# Patient Record
Sex: FEMALE | Race: WHITE | NOT HISPANIC OR LATINO | Employment: OTHER | URBAN - METROPOLITAN AREA
[De-identification: names, ages, dates, MRNs, and addresses within clinical notes are randomized per-mention and may not be internally consistent; named-entity substitution may affect disease eponyms.]

---

## 2017-08-01 ENCOUNTER — APPOINTMENT (OUTPATIENT)
Dept: WOMENS IMAGING | Facility: HOSPITAL | Age: 72
End: 2017-08-01

## 2017-08-01 PROCEDURE — G0202 SCR MAMMO BI INCL CAD: HCPCS | Performed by: RADIOLOGY

## 2017-08-01 PROCEDURE — MDREVIEWSP: Performed by: RADIOLOGY

## 2017-08-01 PROCEDURE — 77063 BREAST TOMOSYNTHESIS BI: CPT | Performed by: RADIOLOGY

## 2018-08-03 ENCOUNTER — APPOINTMENT (OUTPATIENT)
Dept: WOMENS IMAGING | Facility: HOSPITAL | Age: 73
End: 2018-08-03

## 2018-08-03 PROCEDURE — 77067 SCR MAMMO BI INCL CAD: CPT | Performed by: RADIOLOGY

## 2018-08-03 PROCEDURE — 77063 BREAST TOMOSYNTHESIS BI: CPT | Performed by: RADIOLOGY

## 2018-08-03 PROCEDURE — MDREVIEWSP: Performed by: RADIOLOGY

## 2018-09-18 ENCOUNTER — OFFICE VISIT (OUTPATIENT)
Dept: INTERNAL MEDICINE | Facility: CLINIC | Age: 73
End: 2018-09-18

## 2018-09-18 VITALS
OXYGEN SATURATION: 99 % | RESPIRATION RATE: 18 BRPM | HEART RATE: 75 BPM | BODY MASS INDEX: 18.99 KG/M2 | DIASTOLIC BLOOD PRESSURE: 68 MMHG | WEIGHT: 114 LBS | HEIGHT: 65 IN | SYSTOLIC BLOOD PRESSURE: 136 MMHG

## 2018-09-18 DIAGNOSIS — Z23 NEED FOR INFLUENZA VACCINATION: ICD-10-CM

## 2018-09-18 DIAGNOSIS — Z00.00 HEALTH MAINTENANCE EXAMINATION: Primary | ICD-10-CM

## 2018-09-18 PROCEDURE — 90662 IIV NO PRSV INCREASED AG IM: CPT | Performed by: INTERNAL MEDICINE

## 2018-09-18 PROCEDURE — 99387 INIT PM E/M NEW PAT 65+ YRS: CPT | Performed by: INTERNAL MEDICINE

## 2018-09-18 PROCEDURE — G0008 ADMIN INFLUENZA VIRUS VAC: HCPCS | Performed by: INTERNAL MEDICINE

## 2018-09-18 RX ORDER — PHENOL 1.4 %
600 AEROSOL, SPRAY (ML) MUCOUS MEMBRANE DAILY
COMMUNITY

## 2018-09-18 RX ORDER — CHLORAL HYDRATE 500 MG
CAPSULE ORAL
COMMUNITY

## 2018-09-18 NOTE — PROGRESS NOTES
Chief Complaint  Lani oLpez is a 73 y.o. female who presents for Annual Exam (NEW PT CPE)  .    History of Present Illness   Lani is here as a new patient to establish care.  She saw her former PCP Dr. Cody in November 2017.  She had a tetanus shot in November.  She isn't sure if she had an AWV at that time or not.  We do not have any of her old records.  She has had pneumonia shots but doesn't know the dates.      Mammo: 8/3/2018  Pap: S/P hyst, hasn't had one in 20 years.   DEXA: Was once on fosamax and couldn't tolerate, has not had one for several years. Exercises and takes calcium.  did her last one  C-scope: Was normal, does not want to do another unless she needs to.  She thinks it was about 8 years ago.      Exercise: Golf, tennis, and walks three miles daily.     There is no immunization history for the selected administration types on file for this patient.    Review of Systems   Constitution: Negative for chills, fever and malaise/fatigue.   HENT: Negative for hearing loss, hoarse voice and sore throat.    Eyes: Negative for blurred vision, double vision and visual disturbance.   Cardiovascular: Negative for chest pain, leg swelling and palpitations.   Respiratory: Negative for cough and shortness of breath.    Endocrine: Negative for polydipsia and polyuria.   Hematologic/Lymphatic: Does not bruise/bleed easily.   Skin: Negative for rash and suspicious lesions.   Musculoskeletal: Negative for arthritis and myalgias.   Gastrointestinal: Negative for abdominal pain, change in bowel habit, constipation, diarrhea, dysphagia, hematochezia, melena, nausea and vomiting.   Genitourinary: Negative for dysuria and hematuria.   Neurological: Negative for dizziness, headaches and light-headedness.   Psychiatric/Behavioral: Negative for depression. The patient is not nervous/anxious.        There is no problem list on file for this patient.      No past medical history on file.    Past Surgical  "History:   Procedure Laterality Date   • BREAST BIOPSY     • HYSTERECTOMY         Family History   Problem Relation Age of Onset   • Leukemia Mother    • Hypothyroidism Mother    • Hypertension Mother    • Emphysema Father    • Breast cancer Sister    • Colon cancer Maternal Grandfather         age 86       Social History     Social History   • Marital status:      Spouse name: N/A   • Number of children: 3   • Years of education: N/A     Occupational History   • retired teacher      Social History Main Topics   • Smoking status: Former Smoker   • Smokeless tobacco: Never Used      Comment: former light smoker.  quit over 20 years ago   • Alcohol use Yes      Comment: rarely   • Drug use: No   • Sexual activity: Not on file     Other Topics Concern   • Not on file     Social History Narrative   • No narrative on file       No current outpatient prescriptions on file prior to visit.     No current facility-administered medications on file prior to visit.        No Known Allergies    Vitals:    09/18/18 1500   BP: 136/68   Pulse: 75   Resp: 18   SpO2: 99%   Weight: 51.7 kg (114 lb)   Height: 165.1 cm (65\")       Body mass index is 18.97 kg/m².    Objective   Physical Exam   Constitutional: She is oriented to person, place, and time. She appears well-developed and well-nourished. No distress.   HENT:   Head: Normocephalic and atraumatic.   Nose: Nose normal.   Mouth/Throat: Oropharynx is clear and moist.   Eyes: Pupils are equal, round, and reactive to light. Conjunctivae and EOM are normal. No scleral icterus.   Neck: Normal range of motion. Neck supple. No thyromegaly present.   Cardiovascular: Normal rate, regular rhythm and normal heart sounds.  Exam reveals no gallop and no friction rub.    No murmur heard.  Pulses:       Carotid pulses are 2+ on the right side, and 2+ on the left side.       Femoral pulses are 2+ on the right side, and 2+ on the left side.       Dorsalis pedis pulses are 2+ on the right " side, and 2+ on the left side.        Posterior tibial pulses are 2+ on the right side, and 2+ on the left side.   Pulmonary/Chest: Effort normal and breath sounds normal. No respiratory distress. She has no wheezes. She has no rales. Right breast exhibits no mass and no nipple discharge. Left breast exhibits no mass and no nipple discharge.   Abdominal: Soft. Bowel sounds are normal. She exhibits no distension and no mass. There is no tenderness.   Musculoskeletal: Normal range of motion. She exhibits no edema.     Vascular Status -  Her right foot exhibits no edema. Her left foot exhibits no edema.  Skin Integrity  -  Her right foot skin is intact.Her left foot skin is intact..  Lymphadenopathy:     She has no cervical adenopathy.     She has no axillary adenopathy.        Right: No inguinal and no supraclavicular adenopathy present.        Left: No inguinal and no supraclavicular adenopathy present.   Neurological: She is alert and oriented to person, place, and time. She has normal reflexes. No cranial nerve deficit.   Skin: Skin is warm and dry.   Psychiatric: She has a normal mood and affect. Her speech is normal and behavior is normal. Judgment and thought content normal. Cognition and memory are normal.   Vitals reviewed.        Results for orders placed or performed during the hospital encounter of 04/11/16   Adult transthoracic echo complete   Result Value Ref Range    BSA 1.6 m^2    IVSd 0.86 cm    LVIDd 4.2 cm    LVIDs 2.8 cm    LVPWd 0.92 cm    IVS/LVPW 0.93     FS 34.8 %    EDV(Teich) 80.4 ml    ESV(Teich) 28.7 ml    EF(Teich) 64.3 %    EDV(cubed) 76.3 ml    ESV(cubed) 21.2 ml    EF(cubed) 72.3 %    LV mass(C)d 118.9 grams    LV mass(C)dI 76.1 grams/m^2    SV(Teich) 51.7 ml    SI(Teich) 33.1 ml/m^2    SV(cubed) 55.1 ml    SI(cubed) 35.2 ml/m^2    Ao root diam 2.7 cm    Ao root area 5.6 cm^2    ACS 1.9 cm    LVOT diam 1.9 cm    LVOT area 3.0 cm^2    LVOT area(traced) 2.8 cm^2    RVOT diam 1.9 cm    RVOT  area 2.8 cm^2    LVLd ap4 6.3 cm    EDV(MOD-sp4) 61.0 ml    LVLs ap4 5.5 cm    ESV(MOD-sp4) 21.0 ml    EF(MOD-sp4) 65.6 %    SV(MOD-sp4) 40.0 ml    SI(MOD-sp4) 25.6 ml/m^2    MV A dur 0.11 sec    MV E max per 95.6 cm/sec    MV A max per 95.1 cm/sec    MV E/A 1.0     MV V2 max 93.8 cm/sec    MV max PG 3.5 mmHg    MV V2 mean 65.6 cm/sec    MV mean PG 2.0 mmHg    MV V2 VTI 24.0 cm    MVA(VTI) 2.3 cm^2    MV P1/2t max per 98.5 cm/sec    MV P1/2t 63.5 msec    MVA(P1/2t) 3.5 cm^2    MV dec slope 453.9 cm/sec^2    MV dec time 0.23 sec    Ao pk per 129.4 cm/sec    Ao max PG 6.7 mmHg    Ao max PG (full) 4.2 mmHg    Ao V2 mean 85.1 cm/sec    Ao mean PG 3.3 mmHg    Ao mean PG (full) 1.6 mmHg    Ao V2 VTI 25.8 cm    ARY(I,A) 2.2 cm^2    ARY(I,D) 2.2 cm^2    ARY(V,A) 1.8 cm^2    ARY(V,D) 1.8 cm^2    LV V1 max PG 2.5 mmHg    LV V1 mean PG 1.7 mmHg    LV V1 max 79.1 cm/sec    LV V1 mean 64.1 cm/sec    LV V1 VTI 19.0 cm    MR max per 520.7 cm/sec    MR max .5 mmHg    SV(Ao) 143.4 ml    SI(Ao) 91.7 ml/m^2    SV(LVOT) 56.1 ml    SV(RVOT) 36.4 ml    SI(LVOT) 35.9 ml/m^2    PA V2 max 78.7 cm/sec    PA max PG 2.5 mmHg    PA max PG (full) -0.11 mmHg    BH CV ECHO ZULLY - PVA(V,A) 2.9 cm^2    BH CV ECHO ZULLY - PVA(V,D) 2.9 cm^2    PA acc slope 11.5 cm/sec^2    PA acc time 0.13 sec    RV V1 max PG 2.6 mmHg    RV V1 mean PG 1.1 mmHg    RV V1 max 80.5 cm/sec    RV V1 mean 48.8 cm/sec    RV V1 VTI 12.9 cm    TR max per 230.8 cm/sec    RVSP(TR) 24.3 mmHg    RAP systole 3.0 mmHg    PA pr(Accel) 22.0 mmHg    Pulm Sys Per 51.9 cm/sec    Pulm Mejias Per 45.6 cm/sec    Pulm S/D 1.1      CV ECHO ZULLY - BZI_BMI 19.1 kilograms/m^2     CV ECHO ZULLY - BSA([place]) 1.5 m^2     CV ECHO ZULLY - BZI_METRIC_WEIGHT 52.2 kg     CV ECHO ZULLY - BZI_METRIC_HEIGHT 165.1 cm    TDI S' 14 cm    RV Base 2.9 cm    LA volume 31.0 cm3    E/E' ratio 12.0     LV Systolic Volume Index 13.0 mL/m2    LV Diastolic Volume Index 38.0 mL/m2    LA Volume Index 19.0 mL/m2     TAPSE 1.7 cm    Lat Peak E' Per 9.0 cm/sec    Med Peak E' Per 7.0 cm/sec    Pulm A Revs Per 47.0 cm/sec    Abdo Ao Diam 1.8 cm    Pulm A Revs Dur 99.0 sec       Assessment/Plan   Diagnoses and all orders for this visit:    Health maintenance examination    Need for influenza vaccination  -     Fluzone High Dose =>65Years    Other orders  -     Omega-3 Fatty Acids (FISH OIL) 1000 MG capsule capsule; Take  by mouth Daily With Breakfast.  -     calcium carbonate (OS-MARC) 600 MG tablet; Take 600 mg by mouth Daily.  -     Multiple Vitamins-Minerals (MULTIVITAMIN ADULT PO); Take  by mouth.        Discussion/Summary  Lani is here for routine CPE.  We really need to get her old records in order to figure out what she is due for.  I have asked her to physically go and pick her records up and bring them in.  I suspect she is up to date on most health maintenance.  We will base a lab appt on what has been done in the last year.  She is a healthy 72 y/o who exercises and takes good care of herself.        No Follow-up on file.

## 2019-08-20 ENCOUNTER — APPOINTMENT (OUTPATIENT)
Dept: WOMENS IMAGING | Facility: HOSPITAL | Age: 74
End: 2019-08-20

## 2019-08-20 PROCEDURE — 77063 BREAST TOMOSYNTHESIS BI: CPT | Performed by: RADIOLOGY

## 2019-08-20 PROCEDURE — 77067 SCR MAMMO BI INCL CAD: CPT | Performed by: RADIOLOGY

## 2019-08-20 PROCEDURE — MDREVIEWSP: Performed by: RADIOLOGY

## 2019-09-24 DIAGNOSIS — Z00.00 HEALTHCARE MAINTENANCE: Primary | ICD-10-CM

## 2019-09-28 LAB
ALBUMIN SERPL-MCNC: 4.4 G/DL (ref 3.5–5.2)
ALBUMIN/GLOB SERPL: 1.6 G/DL
ALP SERPL-CCNC: 77 U/L (ref 39–117)
ALT SERPL-CCNC: 17 U/L (ref 1–33)
APPEARANCE UR: CLEAR
AST SERPL-CCNC: 22 U/L (ref 1–32)
BACTERIA #/AREA URNS HPF: NORMAL /HPF
BASOPHILS # BLD AUTO: 0.04 10*3/MM3 (ref 0–0.2)
BASOPHILS NFR BLD AUTO: 0.6 % (ref 0–1.5)
BILIRUB SERPL-MCNC: 0.7 MG/DL (ref 0.2–1.2)
BILIRUB UR QL STRIP: NEGATIVE
BUN SERPL-MCNC: 12 MG/DL (ref 8–23)
BUN/CREAT SERPL: 15.2 (ref 7–25)
CALCIUM SERPL-MCNC: 9.2 MG/DL (ref 8.6–10.5)
CHLORIDE SERPL-SCNC: 101 MMOL/L (ref 98–107)
CHOLEST SERPL-MCNC: 203 MG/DL (ref 0–200)
CO2 SERPL-SCNC: 26.8 MMOL/L (ref 22–29)
COLOR UR: YELLOW
CREAT SERPL-MCNC: 0.79 MG/DL (ref 0.57–1)
EOSINOPHIL # BLD AUTO: 0.22 10*3/MM3 (ref 0–0.4)
EOSINOPHIL NFR BLD AUTO: 3.5 % (ref 0.3–6.2)
EPI CELLS #/AREA URNS HPF: NORMAL /HPF
ERYTHROCYTE [DISTWIDTH] IN BLOOD BY AUTOMATED COUNT: 12.1 % (ref 12.3–15.4)
GLOBULIN SER CALC-MCNC: 2.8 GM/DL
GLUCOSE SERPL-MCNC: 95 MG/DL (ref 65–99)
GLUCOSE UR QL: NEGATIVE
HCT VFR BLD AUTO: 41.8 % (ref 34–46.6)
HDLC SERPL-MCNC: 73 MG/DL (ref 40–60)
HGB BLD-MCNC: 13.6 G/DL (ref 12–15.9)
HGB UR QL STRIP: NEGATIVE
IMM GRANULOCYTES # BLD AUTO: 0.02 10*3/MM3 (ref 0–0.05)
IMM GRANULOCYTES NFR BLD AUTO: 0.3 % (ref 0–0.5)
KETONES UR QL STRIP: NEGATIVE
LDLC SERPL CALC-MCNC: 117 MG/DL (ref 0–100)
LEUKOCYTE ESTERASE UR QL STRIP: NEGATIVE
LYMPHOCYTES # BLD AUTO: 1.73 10*3/MM3 (ref 0.7–3.1)
LYMPHOCYTES NFR BLD AUTO: 27.5 % (ref 19.6–45.3)
MCH RBC QN AUTO: 30.3 PG (ref 26.6–33)
MCHC RBC AUTO-ENTMCNC: 32.5 G/DL (ref 31.5–35.7)
MCV RBC AUTO: 93.1 FL (ref 79–97)
MICRO URNS: NORMAL
MICRO URNS: NORMAL
MONOCYTES # BLD AUTO: 0.62 10*3/MM3 (ref 0.1–0.9)
MONOCYTES NFR BLD AUTO: 9.8 % (ref 5–12)
MUCOUS THREADS URNS QL MICRO: PRESENT /HPF
NEUTROPHILS # BLD AUTO: 3.67 10*3/MM3 (ref 1.7–7)
NEUTROPHILS NFR BLD AUTO: 58.3 % (ref 42.7–76)
NITRITE UR QL STRIP: NEGATIVE
NRBC BLD AUTO-RTO: 0 /100 WBC (ref 0–0.2)
PH UR STRIP: 7 [PH] (ref 5–7.5)
PLATELET # BLD AUTO: 271 10*3/MM3 (ref 140–450)
POTASSIUM SERPL-SCNC: 4.1 MMOL/L (ref 3.5–5.2)
PROT SERPL-MCNC: 7.2 G/DL (ref 6–8.5)
PROT UR QL STRIP: NEGATIVE
RBC # BLD AUTO: 4.49 10*6/MM3 (ref 3.77–5.28)
RBC #/AREA URNS HPF: NORMAL /HPF
SODIUM SERPL-SCNC: 142 MMOL/L (ref 136–145)
SP GR UR: 1.01 (ref 1–1.03)
TRIGL SERPL-MCNC: 67 MG/DL (ref 0–150)
URINALYSIS REFLEX: NORMAL
UROBILINOGEN UR STRIP-MCNC: 0.2 MG/DL (ref 0.2–1)
VLDLC SERPL CALC-MCNC: 13.4 MG/DL
WBC # BLD AUTO: 6.3 10*3/MM3 (ref 3.4–10.8)
WBC #/AREA URNS HPF: NORMAL /HPF

## 2019-10-03 ENCOUNTER — OFFICE VISIT (OUTPATIENT)
Dept: INTERNAL MEDICINE | Facility: CLINIC | Age: 74
End: 2019-10-03

## 2019-10-03 VITALS
RESPIRATION RATE: 12 BRPM | DIASTOLIC BLOOD PRESSURE: 80 MMHG | BODY MASS INDEX: 18.83 KG/M2 | OXYGEN SATURATION: 99 % | SYSTOLIC BLOOD PRESSURE: 122 MMHG | TEMPERATURE: 97.8 F | HEIGHT: 65 IN | WEIGHT: 113 LBS | HEART RATE: 81 BPM

## 2019-10-03 DIAGNOSIS — Z00.00 HEALTHCARE MAINTENANCE: Primary | ICD-10-CM

## 2019-10-03 DIAGNOSIS — I83.92 ASYMPTOMATIC VARICOSE VEINS OF LEFT LOWER EXTREMITY: ICD-10-CM

## 2019-10-03 DIAGNOSIS — M85.89 OSTEOPENIA OF MULTIPLE SITES: ICD-10-CM

## 2019-10-03 PROCEDURE — 99397 PER PM REEVAL EST PAT 65+ YR: CPT | Performed by: INTERNAL MEDICINE

## 2019-10-03 PROCEDURE — G0439 PPPS, SUBSEQ VISIT: HCPCS | Performed by: INTERNAL MEDICINE

## 2019-10-03 NOTE — PROGRESS NOTES
"Subjective       Chief Complaint   Patient presents with   • Annual Exam     review of medical issues        HPI:  Lani oLpez is a 74 y.o. female RTC in yearly CPE/ AWV, review of medical issues: Transfer from DR. Flores.   \"I dont have any complaints\".  Notes has a varicose vein on L leg. \"It does not hurt\".  Started wearing some support hose and seems to make worse and larger.  No bleeding.  Getting bigger over time. First noticed this vein for 20 years and \"I am not kidding\".    Had mammo this year, saw derm, dentist and optho all 9/2019.   Osteopenia - noted several years ago. Long term very active and thinks is more so now.  Took Fosamax for 2-3 years and no specific side effects.  \"I just really feel like my body did not want me to take that med\".  Told if she was not going to consider other meds then she should not get DEXA.  Last DEXA was 2000.  Stopped Fosamax ~2000.  Used to see Dr. Cody several years ago. Is not sure about records or vaccine issues.  Is surprised about the fact that records are not over here.   Pap D/C'd after KALANI-BSO in 1997 (for benign cyst).  C-scope was 2010 and it was negative.  \"I hope I never have to have another one of those\".  Notes that \"[facility] will let you do an FOBT\".  Told she could get a Cologuard or flex sig as well.     The following portions of the patient's history were reviewed and updated as appropriate: allergies, current medications, past family history, past medical history, past social history, past surgical history and problem list.    Review of Systems   Constitution: Negative for chills, fever, malaise/fatigue, weight gain and weight loss.   HENT: Negative for congestion, hearing loss, hoarse voice, odynophagia and sore throat.    Eyes: Negative for discharge, double vision, pain and redness.        Last optho exam 9/2019   Cardiovascular: Negative for chest pain, dyspnea on exertion, irregular heartbeat, near-syncope, palpitations and " syncope.   Respiratory: Negative for cough and shortness of breath.    Endocrine: Negative for polydipsia, polyphagia and polyuria.   Hematologic/Lymphatic: Negative for bleeding problem. Does not bruise/bleed easily.   Skin: Negative for rash and suspicious lesions.   Musculoskeletal: Negative for joint pain, joint swelling, muscle cramps, muscle weakness and myalgias.   Gastrointestinal: Negative for constipation, diarrhea, dysphagia, heartburn, nausea and vomiting.   Genitourinary: Negative for dysuria, frequency, hematuria and hesitancy.   Neurological: Negative for dizziness, headaches and light-headedness.   Psychiatric/Behavioral: Negative for depression. The patient does not have insomnia and is not nervous/anxious.    Allergic/Immunologic: Negative for environmental allergies and persistent infections.       Patient Care Team:  Virgilio Rivas MD as PCP - General (Internal Medicine)    Recent Hospitalizations: No recent hospitalization(s).    Depression Screen:   PHQ-2/PHQ-9 Depression Screening 10/3/2019   Little interest or pleasure in doing things 0   Feeling down, depressed, or hopeless 0   Trouble falling or staying asleep, or sleeping too much 0   Feeling tired or having little energy 0   Poor appetite or overeating 0   Feeling bad about yourself - or that you are a failure or have let yourself or your family down 0   Trouble concentrating on things, such as reading the newspaper or watching television 0   Moving or speaking so slowly that other people could have noticed. Or the opposite - being so fidgety or restless that you have been moving around a lot more than usual 0   Thoughts that you would be better off dead, or of hurting yourself in some way 0   Total Score 0   If you checked off any problems, how difficult have these problems made it for you to do your work, take care of things at home, or get along with other people? Not difficult at all       Functional and Cognitive  "Screening:  Functional & Cognitive Status 10/3/2019   Do you have difficulty preparing food and eating? No   Do you have difficulty bathing yourself, getting dressed or grooming yourself? No   Do you have difficulty using the toilet? No   Do you have difficulty moving around from place to place? No   Do you have trouble with steps or getting out of a bed or a chair? No   Current Diet Well Balanced Diet   Dental Exam Up to date   Eye Exam Up to date   Exercise (times per week) 5 times per week   Current Exercise Activities Include Walking   Do you need help using the phone?  No   Are you deaf or do you have serious difficulty hearing?  No   Do you need help with transportation? No   Do you need help shopping? No   Do you need help preparing meals?  No   Do you need help with housework?  No   Do you need help with laundry? No   Do you need help taking your medications? No   Do you need help managing money? No   Do you ever drive or ride in a car without wearing a seat belt? No   Have you felt unusual stress, anger or loneliness in the last month? No   Who do you live with? Spouse   If you need help, do you have trouble finding someone available to you? No   Have you been bothered in the last four weeks by sexual problems? No   Do you have difficulty concentrating, remembering or making decisions? No       Does the patient have evidence of cognitive impairment? no    Does not need ASA (and currently is not on it)    Vitals:    10/03/19 1114   BP: 122/80   Pulse: 81   Resp: 12   Temp: 97.8 °F (36.6 °C)   SpO2: 99%   Weight: 51.3 kg (113 lb)   Height: 165.1 cm (65\")   PainSc: 0-No pain       Body mass index is 18.8 kg/m².    Visual Acuity:  No exam data present    Advanced Care Planning:  Patient has an advance directive - a copy has not been provided. Have asked the patient to send this to us to add to record    Objective   Physical Exam   Constitutional: She is oriented to person, place, and time. She appears " well-developed and well-nourished. No distress.   HENT:   Head: Normocephalic and atraumatic.   Right Ear: Hearing, tympanic membrane, external ear and ear canal normal.   Left Ear: Hearing, tympanic membrane, external ear and ear canal normal.   Nose: Nose normal.   Mouth/Throat: Uvula is midline, oropharynx is clear and moist and mucous membranes are normal. No oropharyngeal exudate.   Soft cerumen bilateral, not fully obstructed.    Eyes: Conjunctivae, EOM and lids are normal. Pupils are equal, round, and reactive to light. Right eye exhibits no discharge. Left eye exhibits no discharge. No scleral icterus.   Neck: Trachea normal, normal range of motion and full passive range of motion without pain. Neck supple. Carotid bruit is not present. No thyroid mass and no thyromegaly present.   Cardiovascular: Normal rate, regular rhythm, S1 normal, S2 normal, normal heart sounds and intact distal pulses. Exam reveals no gallop and no friction rub.   No murmur heard.  Pulses:       Radial pulses are 2+ on the right side, and 2+ on the left side.        Dorsalis pedis pulses are 2+ on the right side, and 2+ on the left side.        Posterior tibial pulses are 2+ on the right side, and 2+ on the left side.   Diffuse spider veins on legs; Single large non-painful varicosity on L upper inner leg.   Pulmonary/Chest: Effort normal and breath sounds normal. No respiratory distress. She has no wheezes. She has no rhonchi. She has no rales.   Abdominal: Soft. Normal appearance and bowel sounds are normal. She exhibits no distension and no mass. There is no hepatosplenomegaly. There is no tenderness. There is no rebound and no guarding.   Musculoskeletal: Normal range of motion. She exhibits no edema.     Vascular Status -  Her right foot exhibits normal foot vasculature  and no edema. Her left foot exhibits normal foot vasculature  and no edema.  Lymphadenopathy:     She has no cervical adenopathy.     She has no axillary  adenopathy.        Right: No inguinal adenopathy present.        Left: No inguinal adenopathy present.   Neurological: She is alert and oriented to person, place, and time. She has normal strength and normal reflexes. She displays no tremor. No cranial nerve deficit or sensory deficit. She exhibits normal muscle tone. Gait normal.   Skin: Skin is warm and dry. No rash noted.   Psychiatric: She has a normal mood and affect. Her behavior is normal. Thought content normal. Cognition and memory are normal.   Vitals reviewed.      Assessment/Plan   Problems Addressed this Visit     Osteopenia of multiple sites    Asymptomatic varicose veins of left lower extremity      Other Visit Diagnoses     Healthcare maintenance    -  Primary              Diagnoses and all orders for this visit:    Healthcare maintenance    Osteopenia of multiple sites    Asymptomatic varicose veins of left lower extremity        Lani Lopez is a 74 y.o. female RTC in yearly CPE/ AWV, review of medical issues: Transfer from Dr. Flores.     1. Varicose Vein, L leg - asx'ic, no bleeding.  Chooses to monitor.   2. Osteopenia - noted in past, s/p Fosamax x 3 years ending in 2000.  Is hesitant for more meds.  Agrees to DEXA upcoming and will make informed decision from there.   3. Hx of psoriasis - noted in distant past, no recurrence. Sees derm annually, last visit 9/2019.  4. HM - labs d/w pt; Flu/ Tdap/ Hep A/ Prevnar/ Pvax/ Shingrix - discussed, pt to get old records today; C-scope due 2020, will refer next year (counseled pt and she agrees preferred path is C-scope); Mammo/ breast exam UTD 8/2019; Pap D/C s/p KALANI-BSO for benign cyst; DEXA schedule; Hep C Ab next labs; c/w TLC diet and exercise as she is; Preventative care plan provided to pt at end of visit; mineral oil to ears prior to shower to allow flush of cerumen B, no Q-tips.       Return in about 1 year (around 10/3/2020) for Annual physical, Medicare Wellness. (include Hep C Ab in  next labs)          Current Outpatient Medications:   •  calcium carbonate (OS-MARC) 600 MG tablet, Take 600 mg by mouth Daily., Disp: , Rfl:   •  Multiple Vitamins-Minerals (MULTIVITAMIN ADULT PO), Take  by mouth., Disp: , Rfl:   •  Omega-3 Fatty Acids (FISH OIL) 1000 MG capsule capsule, Take  by mouth Daily With Breakfast., Disp: , Rfl:

## 2019-10-03 NOTE — PATIENT INSTRUCTIONS
Medicare Wellness  Personal Prevention Plan of Service     Date of Office Visit:  10/03/2019  Encounter Provider:  Virgilio Rivas MD  Place of Service:  [facility]  Patient Name: Lani Lopez  :  1945    As part of the Medicare Wellness portion of your visit today, we are providing you with this personalized preventive plan of services (PPPS). This plan is based upon recommendations of the [place] Preventive Services Task Force (USPSTF) and the Advisory Committee on Immunization Practices (ACIP).    This lists the preventive care services that should be considered, and provides dates of when you are due. Items listed as completed are up-to-date and do not require any further intervention.    Health Maintenance   Topic Date Due   • TDAP/TD VACCINES (1 - Tdap) 1966   • ZOSTER VACCINE (1 of 2) 1997   • PNEUMOCOCCAL VACCINES (65+ LOW/MEDIUM RISK) (1 of 2 - PCV13) 2012   • HEPATITIS C SCREENING  2018   • INFLUENZA VACCINE  2019   • COLONOSCOPY  2020   • MAMMOGRAM  2020   • MEDICARE ANNUAL WELLNESS  10/03/2020       No orders of the defined types were placed in this encounter.      No Follow-up on file.

## 2020-09-09 DIAGNOSIS — Z00.00 HEALTHCARE MAINTENANCE: Primary | ICD-10-CM

## 2020-09-09 DIAGNOSIS — Z11.59 NEED FOR HEPATITIS C SCREENING TEST: ICD-10-CM

## 2020-09-09 DIAGNOSIS — E78.89 LIPIDS ABNORMAL: ICD-10-CM

## 2020-09-17 ENCOUNTER — APPOINTMENT (OUTPATIENT)
Dept: WOMENS IMAGING | Facility: HOSPITAL | Age: 75
End: 2020-09-17

## 2020-09-17 PROCEDURE — 77067 SCR MAMMO BI INCL CAD: CPT | Performed by: RADIOLOGY

## 2020-09-17 PROCEDURE — 77063 BREAST TOMOSYNTHESIS BI: CPT | Performed by: RADIOLOGY

## 2020-09-18 LAB
ALBUMIN SERPL-MCNC: 4.6 G/DL (ref 3.5–5.2)
ALBUMIN/GLOB SERPL: 1.8 G/DL
ALP SERPL-CCNC: 83 U/L (ref 39–117)
ALT SERPL-CCNC: 16 U/L (ref 1–33)
APPEARANCE UR: CLEAR
AST SERPL-CCNC: 22 U/L (ref 1–32)
BACTERIA #/AREA URNS HPF: NORMAL /HPF
BASOPHILS # BLD AUTO: 0.05 10*3/MM3 (ref 0–0.2)
BASOPHILS NFR BLD AUTO: 0.8 % (ref 0–1.5)
BILIRUB SERPL-MCNC: 0.4 MG/DL (ref 0–1.2)
BILIRUB UR QL STRIP: NEGATIVE
BUN SERPL-MCNC: 11 MG/DL (ref 8–23)
BUN/CREAT SERPL: 13.9 (ref 7–25)
CALCIUM SERPL-MCNC: 9.6 MG/DL (ref 8.6–10.5)
CHLORIDE SERPL-SCNC: 102 MMOL/L (ref 98–107)
CHOLEST SERPL-MCNC: 212 MG/DL (ref 0–200)
CO2 SERPL-SCNC: 26.5 MMOL/L (ref 22–29)
COLOR UR: YELLOW
CREAT SERPL-MCNC: 0.79 MG/DL (ref 0.57–1)
EOSINOPHIL # BLD AUTO: 0.19 10*3/MM3 (ref 0–0.4)
EOSINOPHIL NFR BLD AUTO: 3.2 % (ref 0.3–6.2)
EPI CELLS #/AREA URNS HPF: NORMAL /HPF (ref 0–10)
ERYTHROCYTE [DISTWIDTH] IN BLOOD BY AUTOMATED COUNT: 11.9 % (ref 12.3–15.4)
GLOBULIN SER CALC-MCNC: 2.5 GM/DL
GLUCOSE SERPL-MCNC: 96 MG/DL (ref 65–99)
GLUCOSE UR QL: NEGATIVE
HCT VFR BLD AUTO: 40.3 % (ref 34–46.6)
HCV AB S/CO SERPL IA: <0.1 S/CO RATIO (ref 0–0.9)
HDLC SERPL-MCNC: 72 MG/DL (ref 40–60)
HGB BLD-MCNC: 13.5 G/DL (ref 12–15.9)
HGB UR QL STRIP: NEGATIVE
IMM GRANULOCYTES # BLD AUTO: 0.01 10*3/MM3 (ref 0–0.05)
IMM GRANULOCYTES NFR BLD AUTO: 0.2 % (ref 0–0.5)
KETONES UR QL STRIP: NEGATIVE
LDLC SERPL CALC-MCNC: 128 MG/DL (ref 0–100)
LEUKOCYTE ESTERASE UR QL STRIP: NEGATIVE
LYMPHOCYTES # BLD AUTO: 1.36 10*3/MM3 (ref 0.7–3.1)
LYMPHOCYTES NFR BLD AUTO: 23.1 % (ref 19.6–45.3)
MCH RBC QN AUTO: 31.3 PG (ref 26.6–33)
MCHC RBC AUTO-ENTMCNC: 33.5 G/DL (ref 31.5–35.7)
MCV RBC AUTO: 93.3 FL (ref 79–97)
MICRO URNS: NORMAL
MICRO URNS: NORMAL
MONOCYTES # BLD AUTO: 0.52 10*3/MM3 (ref 0.1–0.9)
MONOCYTES NFR BLD AUTO: 8.8 % (ref 5–12)
MUCOUS THREADS URNS QL MICRO: PRESENT /HPF
NEUTROPHILS # BLD AUTO: 3.77 10*3/MM3 (ref 1.7–7)
NEUTROPHILS NFR BLD AUTO: 63.9 % (ref 42.7–76)
NITRITE UR QL STRIP: NEGATIVE
NRBC BLD AUTO-RTO: 0 /100 WBC (ref 0–0.2)
PH UR STRIP: 6 [PH] (ref 5–7.5)
PLATELET # BLD AUTO: 258 10*3/MM3 (ref 140–450)
POTASSIUM SERPL-SCNC: 4.2 MMOL/L (ref 3.5–5.2)
PROT SERPL-MCNC: 7.1 G/DL (ref 6–8.5)
PROT UR QL STRIP: NEGATIVE
RBC # BLD AUTO: 4.32 10*6/MM3 (ref 3.77–5.28)
RBC #/AREA URNS HPF: NORMAL /HPF (ref 0–2)
SODIUM SERPL-SCNC: 140 MMOL/L (ref 136–145)
SP GR UR: 1.02 (ref 1–1.03)
TRIGL SERPL-MCNC: 61 MG/DL (ref 0–150)
URINALYSIS REFLEX: NORMAL
UROBILINOGEN UR STRIP-MCNC: 0.2 MG/DL (ref 0.2–1)
VLDLC SERPL CALC-MCNC: 12.2 MG/DL
WBC # BLD AUTO: 5.9 10*3/MM3 (ref 3.4–10.8)
WBC #/AREA URNS HPF: NORMAL /HPF (ref 0–5)

## 2020-09-22 ENCOUNTER — OFFICE VISIT (OUTPATIENT)
Dept: INTERNAL MEDICINE | Facility: CLINIC | Age: 75
End: 2020-09-22

## 2020-09-22 VITALS
BODY MASS INDEX: 18.49 KG/M2 | HEART RATE: 54 BPM | OXYGEN SATURATION: 96 % | SYSTOLIC BLOOD PRESSURE: 110 MMHG | RESPIRATION RATE: 12 BRPM | TEMPERATURE: 97.1 F | HEIGHT: 65 IN | DIASTOLIC BLOOD PRESSURE: 80 MMHG | WEIGHT: 111 LBS

## 2020-09-22 DIAGNOSIS — M81.0 AGE-RELATED OSTEOPOROSIS WITHOUT CURRENT PATHOLOGICAL FRACTURE: ICD-10-CM

## 2020-09-22 DIAGNOSIS — M85.859 OSTEOPENIA OF HIP, UNSPECIFIED LATERALITY: ICD-10-CM

## 2020-09-22 DIAGNOSIS — Z00.00 HEALTHCARE MAINTENANCE: Primary | ICD-10-CM

## 2020-09-22 DIAGNOSIS — I83.92 ASYMPTOMATIC VARICOSE VEINS OF LEFT LOWER EXTREMITY: ICD-10-CM

## 2020-09-22 PROCEDURE — G0439 PPPS, SUBSEQ VISIT: HCPCS | Performed by: INTERNAL MEDICINE

## 2020-09-22 PROCEDURE — 99397 PER PM REEVAL EST PAT 65+ YR: CPT | Performed by: INTERNAL MEDICINE

## 2020-09-22 NOTE — PROGRESS NOTES
"Subjective       Chief Complaint   Patient presents with   • Annual Exam     review of medical issues        HPI:  Lani Lopez is a 75 y.o. female RTC in yearly AWV/ CPE, review of medical issues:  Has been doing well.  Health has been good. \"I dont have any questions\".   Is missing seeing family. No new dx over year.   1. Varicose Vein, L leg - asx'ic, no bleeding.  No pain.   2. Osteopenia hip and osteoporosis in spine, low FRAX - is very active. Does weights at home. Does all yard work. Very active overall. Will walk up to 5 miles daily, always gets 10 K steps daily.   3. Hx of psoriasis - noted in distant past, no recurrence.   4. HM - had mammogram recently, no exam done.  Desires to do cologuard and will consider C-scope 'once COVID goes down some'.     The following portions of the patient's history were reviewed and updated as appropriate: allergies, current medications, past family history, past medical history, past social history, past surgical history and problem list.    Review of Systems   Constitution: Negative for chills, fever, malaise/fatigue, weight gain and weight loss.   HENT: Negative for congestion, hearing loss (mild, not limiting), odynophagia and sore throat.    Eyes: Negative for discharge, double vision, pain and redness.        Last eye exam ~11/2019; wears readers     Cardiovascular: Negative for chest pain, dyspnea on exertion, irregular heartbeat, leg swelling, near-syncope, palpitations and syncope.   Respiratory: Positive for snoring. Negative for cough, shortness of breath and sleep disturbances due to breathing.    Hematologic/Lymphatic: Negative for bleeding problem. Does not bruise/bleed easily.   Skin: Negative for rash and suspicious lesions.   Musculoskeletal: Negative for joint pain, joint swelling, muscle cramps, muscle weakness and myalgias.   Gastrointestinal: Negative for constipation, diarrhea, dysphagia, heartburn, nausea and vomiting.   Genitourinary: Negative " for bladder incontinence, dysuria, frequency, hematuria, hesitancy and urgency.   Neurological: Negative for excessive daytime sleepiness, dizziness, headaches and light-headedness.   Psychiatric/Behavioral: Negative for depression. The patient does not have insomnia (sleep is good, 'mostly'.  Feels like will wake at times with stresses of all that is going on. ) and is not nervous/anxious.    Allergic/Immunologic: Negative for environmental allergies and persistent infections.       Patient Care Team:  Virgilio Rivas MD as PCP - General (Internal Medicine)    Recent Hospitalizations: No recent hospitalization(s).    Depression Screen:   PHQ-2/PHQ-9 Depression Screening 9/22/2020   Little interest or pleasure in doing things 0   Feeling down, depressed, or hopeless 0   Trouble falling or staying asleep, or sleeping too much -   Feeling tired or having little energy -   Poor appetite or overeating -   Feeling bad about yourself - or that you are a failure or have let yourself or your family down -   Trouble concentrating on things, such as reading the newspaper or watching television -   Moving or speaking so slowly that other people could have noticed. Or the opposite - being so fidgety or restless that you have been moving around a lot more than usual -   Thoughts that you would be better off dead, or of hurting yourself in some way -   Total Score 0   If you checked off any problems, how difficult have these problems made it for you to do your work, take care of things at home, or get along with other people? -       Functional and Cognitive Screening:  Functional & Cognitive Status 9/22/2020   Do you have difficulty preparing food and eating? No   Do you have difficulty bathing yourself, getting dressed or grooming yourself? No   Do you have difficulty using the toilet? No   Do you have difficulty moving around from place to place? No   Do you have trouble with steps or getting out of a bed or a chair? No  "  Current Diet Well Balanced Diet   Dental Exam Up to date   Eye Exam Up to date   Exercise (times per week) 7 times per week   Current Exercise Activities Include Walking   Do you need help using the phone?  No   Are you deaf or do you have serious difficulty hearing?  No   Do you need help with transportation? No   Do you need help shopping? No   Do you need help preparing meals?  No   Do you need help with housework?  No   Do you need help with laundry? No   Do you need help taking your medications? No   Do you need help managing money? No   Do you ever drive or ride in a car without wearing a seat belt? No   Have you felt unusual stress, anger or loneliness in the last month? -   Who do you live with? -   If you need help, do you have trouble finding someone available to you? -   Have you been bothered in the last four weeks by sexual problems? -   Do you have difficulty concentrating, remembering or making decisions? -       Does the patient have evidence of cognitive impairment? no    Does not need ASA (and currently is not on it)    Vitals:    09/22/20 1126   BP: 110/80   Pulse: 54   Resp: 12   Temp: 97.1 °F (36.2 °C)   SpO2: 96%   Weight: 50.3 kg (111 lb)   Height: 165.1 cm (65\")   PainSc: 0-No pain       Body mass index is 18.47 kg/m².    Visual Acuity:  No exam data present    Advanced Care Planning:  ACP discussion was held with the patient during this visit. Patient has an advance directive (not in EMR), copy requested.    Objective   Physical Exam  Vitals signs reviewed.   Constitutional:       General: She is not in acute distress.     Appearance: Normal appearance. She is well-developed. She is not ill-appearing or toxic-appearing.   HENT:      Head: Normocephalic and atraumatic.      Right Ear: Hearing, tympanic membrane, ear canal and external ear normal.      Left Ear: Hearing, tympanic membrane, ear canal and external ear normal.      Ears:      Comments: Cerumen removed to clear for exam     " Nose: Nose normal.      Mouth/Throat:      Mouth: Mucous membranes are moist.      Pharynx: Oropharynx is clear. Uvula midline. No oropharyngeal exudate.   Eyes:      General: Lids are normal. No scleral icterus.     Extraocular Movements: Extraocular movements intact.      Conjunctiva/sclera: Conjunctivae normal.      Pupils: Pupils are equal, round, and reactive to light.   Neck:      Musculoskeletal: Full passive range of motion without pain, normal range of motion and neck supple.      Thyroid: No thyroid mass or thyromegaly.      Vascular: No carotid bruit.      Trachea: Trachea normal.   Cardiovascular:      Rate and Rhythm: Normal rate and regular rhythm.      Pulses:           Radial pulses are 2+ on the right side and 2+ on the left side.        Dorsalis pedis pulses are 2+ on the right side and 2+ on the left side.        Posterior tibial pulses are 2+ on the right side and 2+ on the left side.      Heart sounds: Normal heart sounds, S1 normal and S2 normal. No murmur. No friction rub. No gallop.    Pulmonary:      Effort: Pulmonary effort is normal. No respiratory distress.      Breath sounds: Normal breath sounds. No wheezing, rhonchi or rales.   Chest:      Chest wall: No mass.      Breasts:         Right: No inverted nipple, mass, nipple discharge or skin change.         Left: No inverted nipple, mass, nipple discharge or skin change.   Abdominal:      General: Bowel sounds are normal. There is no distension.      Palpations: Abdomen is soft. There is no mass.      Tenderness: There is no abdominal tenderness. There is no guarding or rebound.   Musculoskeletal: Normal range of motion.      Right lower leg: No edema.      Left lower leg: No edema.      Right foot: Normal range of motion. No deformity or bunion.      Left foot: Normal range of motion. No deformity or bunion.   Feet:      Right foot:      Skin integrity: No ulcer, blister or skin breakdown.      Left foot:      Skin integrity: No ulcer,  blister or skin breakdown.   Lymphadenopathy:      Cervical: No cervical adenopathy.      Right cervical: No superficial, deep or posterior cervical adenopathy.     Left cervical: No superficial, deep or posterior cervical adenopathy.      Upper Body:      Right upper body: No supraclavicular, axillary or pectoral adenopathy.      Left upper body: No supraclavicular, axillary or pectoral adenopathy.      Lower Body: No right inguinal adenopathy. No left inguinal adenopathy.   Skin:     General: Skin is warm and dry.      Findings: No rash.   Neurological:      Mental Status: She is alert and oriented to person, place, and time.      Cranial Nerves: No cranial nerve deficit.      Sensory: No sensory deficit.      Motor: No weakness, tremor, atrophy or abnormal muscle tone.      Gait: Gait normal.      Deep Tendon Reflexes: Reflexes are normal and symmetric.      Reflex Scores:       Patellar reflexes are 2+ on the right side and 2+ on the left side.       Achilles reflexes are 2+ on the right side and 2+ on the left side.  Psychiatric:         Behavior: Behavior normal.         Assessment/Plan   Lani was seen today for annual exam.    Diagnoses and all orders for this visit:    Healthcare maintenance  -     Ambulatory Referral For Screening Colonoscopy    Asymptomatic varicose veins of left lower extremity    Osteopenia of hip, unspecified laterality    Age-related osteoporosis without current pathological fracture            Diagnoses and all orders for this visit:    Healthcare maintenance  -     Ambulatory Referral For Screening Colonoscopy    Asymptomatic varicose veins of left lower extremity    Osteopenia of hip, unspecified laterality    Age-related osteoporosis without current pathological fracture        Lani Lopez is a 75 y.o. female RTC in yearly AWV/ CPE, review of medical issues:  1. Varicose Vein, L leg - asx'ic, no bleeding, no pain. Monitor.   2. Osteopenia hip and osteoporosis in spine, low  FRAX on 10/2019 DEXA, excellent exercise routine; hx of Fosamax x 3 years ending in 2000 - c/w weight bearing exercise, Calcium daily, and MVI.  DEXA due 10/2021.  3. Hx of psoriasis - noted in distant past, no recurrence. Sees derm annually.   4. Cerumen impaction B - removed in office today by edwige. Tolerated well.   5. HM - labs d/w pt; Flu/ Tdap/ Prevnar/ Pvax - UTD; Shingrix/ Hep A at pharmacy, c/w pt;  C-scope due 2020, referral placed, pt considering cologuard from insurance as well, counseled; Mammo (-) 9/2020;  breast exam OK today; Pap D/C s/p [id number]-BSO for benign cyst; DEXA due 10/2021; Hep C Ab (-) 9/2020; c/w TLC diet and exercise as she is; Preventative care plan provided to pt at end of visit    RTC one year CPE/ AWV, review of medical issues     Return in about 1 year (around 9/22/2021) for Annual physical, Medicare Wellness.          Current Outpatient Medications:   •  calcium carbonate (OS-MARC) 600 MG tablet, Take 600 mg by mouth Daily., Disp: , Rfl:   •  Multiple Vitamins-Minerals (MULTIVITAMIN ADULT PO), Take  by mouth., Disp: , Rfl:   •  Omega-3 Fatty Acids (FISH OIL) 1000 MG capsule capsule, Take  by mouth Daily With Breakfast., Disp: , Rfl:

## 2020-09-22 NOTE — PATIENT INSTRUCTIONS
Shingrix (new shingles shot; 2 shot series) check at pharmacy  Hepatitis A (2 shot series) consider at pharmacy      Medicare Wellness  Personal Prevention Plan of Service     Date of Office Visit:  2020  Encounter Provider:  Virgilio Rivas MD  Place of Service:  [facility] INTER[facility] MEDIC[facility]  Patient Name: Lani Lopez  :  1945    As part of the Medicare Wellness portion of your visit today, we are providing you with this personalized preventive plan of services (PPPS). This plan is based upon recommendations of the [place] Preventive Services Task Force (USPSTF) and the Advisory Committee on Immunization Practices (ACIP).    This lists the preventive care services that should be considered, and provides dates of when you are due. Items listed as completed are up-to-date and do not require any further intervention.    Health Maintenance   Topic Date Due   • ZOSTER VACCINE (1 of 2) 1997   • COLONOSCOPY  2020   • MAMMOGRAM  2021   • MEDICARE ANNUAL WELLNESS  2021   • DXA SCAN  10/10/2021   • TDAP/TD VACCINES (2 - Td) 2027   • HEPATITIS C SCREENING  Completed   • Pneumococcal Vaccine Once at 65 Years Old  Completed   • INFLUENZA VACCINE  Completed       Orders Placed This Encounter   Procedures   • Ambulatory Referral For Screening Colonoscopy     Referral Priority:   Routine     Referral Type:   Diagnostic Medical     Referral Reason:   Specialty Services Required     Referred to Provider:   Hitesh Grier MD     Requested Specialty:   Gastroenterology     Number of Visits Requested:   1       Return in about 1 year (around 2021) for Annual physical, Medicare Wellness.

## 2020-10-05 ENCOUNTER — TELEPHONE (OUTPATIENT)
Dept: INTERNAL MEDICINE | Facility: CLINIC | Age: 75
End: 2020-10-05

## 2020-10-05 NOTE — TELEPHONE ENCOUNTER
Patient called and requested lab results from 9/17 be mailed to her     Please advise   [phone]

## 2020-10-26 ENCOUNTER — TELEPHONE (OUTPATIENT)
Dept: INTERNAL MEDICINE | Facility: CLINIC | Age: 75
End: 2020-10-26

## 2020-10-26 ENCOUNTER — APPOINTMENT (OUTPATIENT)
Dept: CT IMAGING | Facility: HOSPITAL | Age: 75
End: 2020-10-26

## 2020-10-26 ENCOUNTER — HOSPITAL ENCOUNTER (EMERGENCY)
Facility: HOSPITAL | Age: 75
Discharge: HOME | End: 2020-10-26
Attending: EMERGENCY MEDICINE | Admitting: EMERGENCY MEDICINE

## 2020-10-26 ENCOUNTER — APPOINTMENT (OUTPATIENT)
Dept: GENERAL RADIOLOGY | Facility: HOSPITAL | Age: 75
End: 2020-10-26

## 2020-10-26 VITALS
OXYGEN SATURATION: 96 % | BODY MASS INDEX: 17.68 KG/M2 | TEMPERATURE: 97.9 F | HEIGHT: 66 IN | SYSTOLIC BLOOD PRESSURE: 147 MMHG | WEIGHT: 110 LBS | DIASTOLIC BLOOD PRESSURE: 81 MMHG | RESPIRATION RATE: 18 BRPM | HEART RATE: 73 BPM

## 2020-10-26 DIAGNOSIS — S52.592A OTHER CLOSED FRACTURE OF DISTAL END OF LEFT RADIUS, INITIAL ENCOUNTER: Primary | ICD-10-CM

## 2020-10-26 DIAGNOSIS — S01.81XA FACIAL LACERATION, INITIAL ENCOUNTER: ICD-10-CM

## 2020-10-26 PROCEDURE — 73110 X-RAY EXAM OF WRIST: CPT

## 2020-10-26 PROCEDURE — 70450 CT HEAD/BRAIN W/O DYE: CPT

## 2020-10-26 PROCEDURE — 99283 EMERGENCY DEPT VISIT LOW MDM: CPT

## 2020-10-26 PROCEDURE — 25010000003 LIDOCAINE 1 % SOLUTION: Performed by: EMERGENCY MEDICINE

## 2020-10-26 RX ORDER — HYDROCODONE BITARTRATE AND ACETAMINOPHEN 7.5; 325 MG/1; MG/1
1 TABLET ORAL EVERY 6 HOURS PRN
Qty: 15 TABLET | Refills: 0 | Status: SHIPPED | OUTPATIENT
Start: 2020-10-26 | End: 2021-12-02

## 2020-10-26 RX ORDER — HYDROCODONE BITARTRATE AND ACETAMINOPHEN 7.5; 325 MG/1; MG/1
1 TABLET ORAL ONCE
Status: COMPLETED | OUTPATIENT
Start: 2020-10-26 | End: 2020-10-26

## 2020-10-26 RX ORDER — LIDOCAINE HYDROCHLORIDE 10 MG/ML
10 INJECTION, SOLUTION INFILTRATION; PERINEURAL ONCE
Status: COMPLETED | OUTPATIENT
Start: 2020-10-26 | End: 2020-10-26

## 2020-10-26 RX ORDER — BUPIVACAINE HYDROCHLORIDE 5 MG/ML
10 INJECTION, SOLUTION EPIDURAL; INTRACAUDAL ONCE
Status: COMPLETED | OUTPATIENT
Start: 2020-10-26 | End: 2020-10-26

## 2020-10-26 RX ADMIN — HYDROCODONE BITARTRATE AND ACETAMINOPHEN 1 TABLET: 7.5; 325 TABLET ORAL at 17:12

## 2020-10-26 RX ADMIN — BUPIVACAINE HYDROCHLORIDE 10 ML: 5 INJECTION, SOLUTION EPIDURAL; INTRACAUDAL at 16:02

## 2020-10-26 RX ADMIN — LIDOCAINE HYDROCHLORIDE 10 ML: 10 INJECTION, SOLUTION INFILTRATION; PERINEURAL at 16:02

## 2020-10-26 NOTE — ED NOTES
Pt presents to ED via EMS from gym. Pt was playing tennis and fell forward. Pt complains of L wrist swelling and pain, with dizziness. EMS placed splint on the L arm at this time. Pt denies LOC, but did hit her head when she fell, denies use of blood thinners. Pt is A&Ox4, able to ambulate, and in a mask.               Phill Fitch, RN  10/26/20 7974

## 2020-10-26 NOTE — TELEPHONE ENCOUNTER
PT STATES THAT SHE FELL ON THE TENNIS COURT TODAY AND HAS CUT HER LEFT EYEBROW.PT STATES THAT A NURSE AT THE [facility] SAYS SHE MAY NEED SOME STITCHES.  PT WANTS TO KNOW IF SHE NEEDS TO COME IN OR SEE SOMEONE AT ANOTHER FACILITY.  PT IS ALSO EXPERIENCING PAIN IN HER LEFT WRIST.        PT CALL BACK   [phone]  [facility] [address] [phone]

## 2020-10-26 NOTE — ED PROVIDER NOTES
Laceration Repair    Date/Time: 10/26/2020 5:56 PM  Performed by: Shannon Ceron PA  Authorized by: Han Barclay MD     Consent:     Consent obtained:  Verbal    Consent given by:  Patient    Risks discussed:  Infection, poor cosmetic result and poor wound healing    Alternatives discussed:  No treatment  Laceration details:     Location:  Face    Face location:  L eyebrow    Length (cm):  1    Depth (mm):  5  Repair type:     Repair type:  Simple  Pre-procedure details:     Preparation:  Patient was prepped and draped in usual sterile fashion  Exploration:     Hemostasis achieved with:  Cautery and direct pressure  Treatment:     Wound cleansed with: chloraprep.    Amount of cleaning:  Standard    Irrigation solution:  Sterile water    Irrigation method:  Syringe    Visualized foreign bodies/material removed: no    Skin repair:     Repair method:  Sutures    Suture size:  6-0    Suture technique:  Simple interrupted    Number of sutures:  3  Approximation:     Approximation:  Close  Post-procedure details:     Dressing:  Open (no dressing)    Patient tolerance of procedure:  Tolerated well, no immediate complications  Splint - Cast - Strapping    Date/Time: 10/26/2020 5:57 PM  Performed by: Shannon Ceron PA  Authorized by: Han Barclay MD     Consent:     Consent obtained:  Verbal    Consent given by:  Patient    Risks discussed:  Numbness and pain    Alternatives discussed:  No treatment  Pre-procedure details:     Sensation:  Normal  Procedure details:     Laterality:  Left    Location:  Wrist    Wrist:  L wrist    Splint type:  Sugar tong    Supplies:  Ortho-Glass  Post-procedure details:     Pain:  Improved    Sensation:  Unchanged    Patient tolerance of procedure:  Tolerated well, no immediate complications           Shannon Ceron PA  10/26/20 1758

## 2020-10-26 NOTE — TELEPHONE ENCOUNTER
Pt's cut is right above her eyebrow. She is now having dizziness, sick to stomach and feels like she is going to pass out. They are calling EMS for pt to be checked out.

## 2020-10-26 NOTE — ED NOTES
Ice pack given for the Left wrist. Wore the appropriate PPE as did the patient.      Archie Emanuel  10/26/20 1543

## 2020-10-26 NOTE — ED PROVIDER NOTES
EMERGENCY DEPARTMENT ENCOUNTER    Room Number:  27/27  Date of encounter:  10/26/2020  PCP: Virgilio Rivas MD  Historian: Patient      HPI:  Chief Complaint: Fall  A complete HPI/ROS/PMH/PSH/SH/FH are unobtainable due to: Nothing    Context: Lani Lopez is a 75 y.o. female who presents to the ED c/o of fall while playing tennis at her club today.  Patient said she fell backwards, twisted, landed on her outstretched left arm and hit the front of her head.  She said she had no loss of consciousness but does have a mild frontal headache and severe pain in the left wrist.  The pain in the wrist is located on the dorsum, does not radiate, and there is no numbness or tingling.      PAST MEDICAL HISTORY  Active Ambulatory Problems     Diagnosis Date Noted   • Osteopenia of hip 10/03/2019   • Asymptomatic varicose veins of left lower extremity 10/03/2019   • Age-related osteoporosis without current pathological fracture 09/22/2020     Resolved Ambulatory Problems     Diagnosis Date Noted   • No Resolved Ambulatory Problems     Past Medical History:   Diagnosis Date   • Osteopenia of multiple sites 10/3/2019   • Ovarian cyst 1997   • Psoriasis    • Shingles          PAST SURGICAL HISTORY  Past Surgical History:   Procedure Laterality Date   • BREAST BIOPSY Left 2000    benign   • CATARACT EXTRACTION, BILATERAL  2015   • TOTAL ABDOMINAL HYSTERECTOMY WITH SALPINGO OOPHORECTOMY  1997         FAMILY HISTORY  Family History   Problem Relation Age of Onset   • Leukemia Mother    • Hypothyroidism Mother    • Hypertension Mother    • Osteoporosis Mother    • Emphysema Father         smoker   • Prostate cancer Father    • Breast cancer Sister         bilateral at age 55; genetic testing negative BRCA   • Colon cancer Maternal Grandfather         age 86   • Leukemia Niece    • No Known Problems Daughter    • No Known Problems Daughter    • Melanoma Daughter          SOCIAL HISTORY  Social History     Socioeconomic History   •  Marital status:      Spouse name: Not on file   • Number of children: 3   • Years of education: Not on file   • Highest education level: Not on file   Occupational History   • Occupation: retired teacher     Comment: [employer]   Tobacco Use   • Smoking status: Former Smoker     Quit date:      Years since quittin.8   • Smokeless tobacco: Never Used   • Tobacco comment: former light smoker.  quit over 20 years ago   Substance and Sexual Activity   • Alcohol use: Yes     Comment: ~1 drink/ month   • Drug use: No   • Sexual activity: Yes     Partners: Male     Comment: no hx of STD's   Lifestyle   • Physical activity     Days per week: 7 days     Minutes per session: 60 min   • Stress: Not on file   Social History Narrative    Diet - overall healthy; eats fruits and vegetables    Exercise - daily walking and tennis and golf    Caffeine - 3 cups coffee daily         ALLERGIES  Patient has no known allergies.        REVIEW OF SYSTEMS  Review of Systems     All systems reviewed and negative except for those discussed in HPI.       PHYSICAL EXAM    I have reviewed the triage vital signs and nursing notes.    ED Triage Vitals [10/26/20 1439]   Temp Heart Rate Resp BP SpO2   97.9 °F (36.6 °C) 73 18 145/67 100 %      Temp src Heart Rate Source Patient Position BP Location FiO2 (%)   Oral Monitor Sitting Right arm --       Physical Exam  GENERAL: Awake and alert, mild distress  HENT: nares patent, she has a very small superficial laceration above the left eyebrow with some localized tenderness.  It is linear and less than a centimeter in size.  Her neck is nontender to palpation, with full range of motion and no pain  EYES: no scleral icterus, Karen, EOMI  CV: regular rhythm, regular rate  RESPIRATORY: normal effort  ABDOMEN: soft  MUSCULOSKELETAL: There is tenderness and swelling along with ecchymosis of the distal left wrist.  There is possible deformity there, but hand is neurovascularly intact.  NEURO: alert,  moves all extremities, follows commands  SKIN: warm, dry        LAB RESULTS  No results found for this or any previous visit (from the past 24 hour(s)).    Ordered the above labs and independently reviewed the results.        RADIOLOGY  Xr Wrist 3+ View Left    Result Date: 10/26/2020  THREE-VIEW LEFT WRIST  HISTORY: Fell. Pain.  There is an acute fracture of the distal radial metaphysis with some associated volar angulation. There is a minimal component of fracture extending vertically into the epiphysis at the level of the styloid process of the radius.  This report was finalized on 10/26/2020 4:54 PM by Dr. Alton Owens M.D.      Ct Head Without Contrast    Result Date: 10/26/2020  CT HEAD WITHOUT CONTRAST  HISTORY: Dizziness. Patient hit her head when she fell.  TECHNIQUE:  Head CT includes axial imaging from the base of skull to the vertex without intravenous contrast. Radiation dose reduction techniques were utilized, including automated exposure control and exposure modulation based on body size.  COMPARISON:None  FINDINGS: There are no abnormal areas of increased attenuation intra-axially to suggest hemorrhage. No extra-axial fluid collection is observed. There is no evidence for cerebral edema or mass effect or shift of the midline structures.  There is left anterior frontal/supraorbital laceration with overlying bandaging material. Bone windows demonstrate no underlying fracture. Mastoid air cells and visualized paranasal sinuses appear clear.      No evidence for acute intracranial abnormality.. Left anterior frontal scalp injury/laceration without underlying fracture.  This report was finalized on 10/26/2020 4:40 PM by Dr. Alexandr Cheng M.D.        I ordered the above noted radiological studies. Reviewed by me and discussed with radiologist.  See dictation for official radiology interpretation.      PROCEDURES    Procedures      MEDICATIONS GIVEN IN ER    Medications   lidocaine (XYLOCAINE) 1 %  injection 10 mL (10 mL Injection Given 10/26/20 1602)   bupivacaine (PF) (MARCAINE) 0.5 % injection 10 mL (10 mL Injection Given 10/26/20 1602)   HYDROcodone-acetaminophen (NORCO) 7.5-325 MG per tablet 1 tablet (1 tablet Oral Given 10/26/20 1712)         PROGRESS, DATA ANALYSIS, CONSULTS, AND MEDICAL DECISION MAKING    All labs have been independently reviewed by me.  All radiology studies have been reviewed by me and discussed with radiologist dictating the report.   EKG's independently viewed and interpreted by me.  Discussion below represents my analysis of pertinent findings related to patient's condition, differential diagnosis, treatment plan and final disposition.        ED Course as of Oct 26 2111   Mon Oct 26, 2020   2110 CT scan of the head is negative    [DP]   2110 Plain film of the left wrist shows a displaced distal radius fracture    [DP]   2110 Please review the notes from our midlevel provider documenting the laceration repair and splint placement.    [DP]   2111 Patient safe for discharge home with head injury precautions and follow-up with Dr. Decker from hand surgery.    [DP]      ED Course User Index  [DP] Han Barclay MD           PPE: Both the patient and I wore a surgical mask throughout the entire patient encounter. I wore protective goggles.     AS OF 21:11 EDT VITALS:    BP - 147/81  HR - 73  TEMP - 97.9 °F (36.6 °C) (Oral)  O2 SATS - 96%        DIAGNOSIS  Final diagnoses:   Facial laceration, initial encounter   Other closed fracture of distal end of left radius, initial encounter         DISPOSITION  Discharge           Han Barclay MD  10/26/20 2111

## 2020-10-27 ENCOUNTER — EPISODE CHANGES (OUTPATIENT)
Dept: CASE MANAGEMENT | Facility: OTHER | Age: 75
End: 2020-10-27

## 2020-10-28 ENCOUNTER — PATIENT OUTREACH (OUTPATIENT)
Dept: CASE MANAGEMENT | Facility: OTHER | Age: 75
End: 2020-10-28

## 2020-10-28 ENCOUNTER — OFFICE VISIT (OUTPATIENT)
Dept: ORTHOPEDIC SURGERY | Facility: CLINIC | Age: 75
End: 2020-10-28

## 2020-10-28 VITALS — WEIGHT: 110 LBS | TEMPERATURE: 96.5 F | HEIGHT: 66 IN | BODY MASS INDEX: 17.68 KG/M2

## 2020-10-28 DIAGNOSIS — S52.502A CLOSED FRACTURE OF DISTAL END OF LEFT RADIUS, UNSPECIFIED FRACTURE MORPHOLOGY, INITIAL ENCOUNTER: Primary | ICD-10-CM

## 2020-10-28 PROCEDURE — 25600 CLTX DST RDL FX/EPHYS SEP WO: CPT | Performed by: ORTHOPAEDIC SURGERY

## 2020-10-28 PROCEDURE — 99204 OFFICE O/P NEW MOD 45 MIN: CPT | Performed by: ORTHOPAEDIC SURGERY

## 2020-10-28 NOTE — OUTREACH NOTE
Patient Outreach Note    Contacted related to ER visit on 10/26/20. Patient was seen by Dr. Anthony today & placed in a splint. Education done on keeping arm elevated on a pillow when sitting, do not let it dangle by your side & assess circulation in fingers & call MD if having changes. Patient will return to Dr. Anthony next week for possible casting of wrist. Health Maintenance gaps reviewed, she has the paperwork to fill out for her colonoscopy and is planning on getting #2 Zoster immunization. Patient is interested in [facility] (given phone number to call) Explained role of Care Advisor and contact information given to patient.Patient is aware phone number to the 24/7 Nurse Line ([phone]) & [facility] Hotline ([phone]) found on AVS from ED visit.Nurse provided patient education.No other questions, concerns or needs regarding health and wellness voiced at this time.     Simi Reinoso RN  Ambulatory     10/28/2020, 13:21 EDT       n/a

## 2020-10-28 NOTE — PROGRESS NOTES
History & Physical       Patient: Lani Lopez    YOB: 1945    Medical Record Number: [id number]    Chief Complaints: Left wrist injury    History of Present Illness: 75 y.o. female presents for evaluation of the left wrist.  The injury was sustained 2 days ago in a fall onto her outstretched left upper extremity while playing tennis.  She was seen in the emergency room and placed in a splint went.  Current pain is described as moderate, constant, and aching.  Pain is worse with movement.  Rest, the splint, and pain medicine and have all helped.  The patient is right hand dominant.  She reports normal use and function of the left arm prior to the fall.    Allergies: No Known Allergies    Home Medications:      Current Outpatient Medications:   •  calcium carbonate (OS-MARC) 600 MG tablet, Take 600 mg by mouth Daily., Disp: , Rfl:   •  HYDROcodone-acetaminophen (NORCO) 7.5-325 MG per tablet, Take 1 tablet by mouth Every 6 (Six) Hours As Needed for Moderate Pain ., Disp: 15 tablet, Rfl: 0  •  Multiple Vitamins-Minerals (MULTIVITAMIN ADULT PO), Take  by mouth., Disp: , Rfl:   •  Omega-3 Fatty Acids (FISH OIL) 1000 MG capsule capsule, Take  by mouth Daily With Breakfast., Disp: , Rfl:     Past Medical History:   Diagnosis Date   • Osteopenia of multiple sites 10/3/2019    dx'd in ~; s/p Fosamax x 3 years stopping in    • Ovarian cyst     benign; s/p KALANI-BSO   • Psoriasis     resolved   • Shingles           Past Surgical History:   Procedure Laterality Date   • BREAST BIOPSY Left     benign   • CATARACT EXTRACTION, BILATERAL     • TOTAL ABDOMINAL HYSTERECTOMY WITH SALPINGO OOPHORECTOMY            Social History     Occupational History   • Occupation: retired teacher     Comment: [employer]   Tobacco Use   • Smoking status: Former Smoker     Quit date:      Years since quittin.8   • Smokeless tobacco: Never Used   • Tobacco comment: former light smoker.  quit over 20 years  "ago   Substance and Sexual Activity   • Alcohol use: Yes     Comment: ~1 drink/ month   • Drug use: No   • Sexual activity: Yes     Partners: Male     Comment: no hx of STD's      Social History     Social History Narrative    Diet - overall healthy; eats fruits and vegetables    Exercise - daily walking and tennis and golf    Caffeine - 3 cups coffee daily          Family History   Problem Relation Age of Onset   • Leukemia Mother    • Hypothyroidism Mother    • Hypertension Mother    • Osteoporosis Mother    • Emphysema Father         smoker   • Prostate cancer Father    • Breast cancer Sister         bilateral at age 55; genetic testing negative BRCA   • Colon cancer Maternal Grandfather         age 86   • Leukemia Niece    • No Known Problems Daughter    • No Known Problems Daughter    • Melanoma Daughter        Review of Systems:      Constitutional: Denies fever, shaking or chills   Eyes: Denies change in visual acuity   HEENT: Denies nasal congestion or sore throat   Respiratory: Denies cough or shortness of breath   Cardiovascular: Denies chest pain or edema  Endocrine: Denies tremors, palpitations, intolerance of heat or cold, polyuria, polydipsia.  GI: Denies abdominal pain, nausea, vomiting, bloody stools or diarrhea  : Denies frequency, urgency, incontinence, retention, or nocturia.  Musculoskeletal: Denies numbness tingling or loss of motor function except as above  Integument: Denies rash, lesion or ulceration   Neurologic: Denies headache or focal weakness, deficits  Heme: Denies epistaxis, spontaneous or excessive bleeding, epistaxis, hematuria, melena, fatigue, enlarged or tender lymph nodes.      All other pertinent positives and negatives as noted above in HPI.    Physical Exam: 75 y.o. female    Vitals:    10/28/20 1149   Temp: 96.5 °F (35.8 °C)   TempSrc: Temporal   Weight: 49.9 kg (110 lb)   Height: 166.4 cm (65.5\")       General:  Patient is awake and alert.  Appears in no acute distress or " discomfort.    Psych:  Affect and demeanor are appropriate.    Eyes:  Conjunctiva and sclera appear grossly normal.  Eyes track well and EOM seem to be intact.    Dentition:  No gross abnormalities noted.    Ears:  No gross abnormalities.  Hearing adequate for the exam.    Cardiovascular:  Regular rate and rhythm.    Lungs:  Good chest expansion.  Breathing unlabored.    Lymph:  No palpable masses or adenopathy in the affected extremity    Left upper extremity:  Splint was in place and removed.  She has dorsal edema and ecchymosis at her wrist.  Skin appears benign.  No lacerations or abrasions.  No obvious deformity but she is edematous.  Focal tenderness noted over the distal radius.  No tenderness in the hand or into the upper arm or elbow.  No palpable masses or adenopathy.  Compartments soft.  Painful, limited ROM of the wrist.  Could not assess stability due to discomfort with motion.  Good strength in the hand with finger flexion and extension as well as thumb abduction.  Intact sensation.  Brisk cap refill.  Palpable radial pulse.     Diagnostic Tests:  Lab Results   Component Value Date    CALCIUM 9.6 09/17/2020     09/17/2020    K 4.2 09/17/2020    CO2 26.5 09/17/2020     09/17/2020    BUN 11 09/17/2020    CREATININE 0.79 09/17/2020    EGFRIFAFRI 86 09/17/2020    EGFRIFNONA 71 09/17/2020    BCR 13.9 09/17/2020     Lab Results   Component Value Date    WBC 5.90 09/17/2020    HGB 13.5 09/17/2020    HCT 40.3 09/17/2020    MCV 93.3 09/17/2020     09/17/2020     No results found for: INR, PROTIME    Imaging:  Outside AP, oblique and lateral views of the left wrist are reviewed along with the associated report.  The x-rays show a comminuted distal radius fracture.  There appears to be a large volar Edmondson's fragment.  She does appear to have some mild pre-existing arthritis as well.  On the outside films, I cannot tell if the volar Edmondson's fragment is displaced significantly or not.  I  repeated a lateral view today to evaluate that fragment.  This is compared to her previous x-rays.  It looks like the Delvis's fragment is displaced a millimeter or 2 but not enough to warrant surgery in my opinion.    Assessment:  Left distal radius fracture    Plan:  I recommend closed treatment of this injury.  I think that she should do well if we can get this to heal in its current alignment.  Risks were discussed including nonunion, malunion, displacement necessitating operative intervention, arthrofibrosis, and persistent pain or motion loss necessitating further intervention.  She is too swollen to put her in a cast today.  I loosen her splint a bit and then rewrapped it.  I am going to see her back next week for repeat x-rays.  At that point, I will plan to convert her to a cast assuming there has been no further displacement and that her swelling is improved.  We discussed appropriate activity modifications and restrictions including no lifting, pushing or pulling with the left hand.  She did not feel that she needed any more pain medicine at this time.  I will see her back next week.    Date: 10/28/2020    Taye Anthony MD

## 2020-11-02 ENCOUNTER — OFFICE VISIT (OUTPATIENT)
Dept: INTERNAL MEDICINE | Facility: CLINIC | Age: 75
End: 2020-11-02

## 2020-11-02 VITALS
WEIGHT: 114 LBS | BODY MASS INDEX: 18.32 KG/M2 | HEIGHT: 66 IN | DIASTOLIC BLOOD PRESSURE: 80 MMHG | OXYGEN SATURATION: 98 % | TEMPERATURE: 97.1 F | SYSTOLIC BLOOD PRESSURE: 120 MMHG | HEART RATE: 66 BPM
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
## 2020-11-02 DIAGNOSIS — S01.112A LACERATION OF LEFT EYEBROW, INITIAL ENCOUNTER: Primary | ICD-10-CM

## 2020-11-02 PROCEDURE — 99213 OFFICE O/P EST LOW 20 MIN: CPT | Performed by: INTERNAL MEDICINE

## 2020-11-02 NOTE — PROGRESS NOTES
"Suture / Staple Removal      HPI  Lani Lopez is a 75 y.o. female RTC in acute care:   Was playing tennis last week and fell on tennis court and broke L wrist.  Was a bit dizzy and had CT scan that was OK.  Has laceration on L eye border. Needs removed today.  \"That is why I am here\".   Has appt with Dr. Anthony in 2 days for repeat X-ray.  Is hoping to avoid surgery.  Is really not in a lot of pain. Took Tylenol yesterday, none today. However, can make pain onset with certain movement of hand.   Wound has healed at eye. No drainage or bleeding. Vision is OK.   Wears protective glasses on tennis court and thinks that is what hit face.     Review of Systems   Constitution: Negative for chills and fever.   Eyes: Negative for blurred vision, double vision, vision loss in left eye and vision loss in right eye.   Hematologic/Lymphatic: Negative for bleeding problem.   Skin: Negative for poor wound healing.       The following portions of the patient's history were reviewed and updated as appropriate: allergies, current medications, past medical history, past social history and problem list.      Current Outpatient Medications:   •  calcium carbonate (OS-MARC) 600 MG tablet, Take 600 mg by mouth Daily., Disp: , Rfl:   •  Multiple Vitamins-Minerals (MULTIVITAMIN ADULT PO), Take  by mouth., Disp: , Rfl:   •  Omega-3 Fatty Acids (FISH OIL) 1000 MG capsule capsule, Take  by mouth Daily With Breakfast., Disp: , Rfl:   •  HYDROcodone-acetaminophen (NORCO) 7.5-325 MG per tablet, Take 1 tablet by mouth Every 6 (Six) Hours As Needed for Moderate Pain ., Disp: 15 tablet, Rfl: 0    Vitals:    11/02/20 1542   BP: 120/80   Pulse: 66   Temp: 97.1 °F (36.2 °C)   SpO2: 98%   Weight: 51.7 kg (114 lb)   Height: 166.4 cm (65.5\")     Body mass index is 18.68 kg/m².      Physical Exam  Vitals signs reviewed.   Constitutional:       Appearance: She is well-developed. She is not toxic-appearing.   HENT:      Head: Normocephalic and " atraumatic.   Eyes:      General: No scleral icterus.     Pupils: Pupils are equal, round, and reactive to light.   Pulmonary:      Effort: Pulmonary effort is normal. No respiratory distress.   Musculoskeletal:      Left wrist: She exhibits decreased range of motion (in cast) and swelling (to hand).   Skin:         Neurological:      Mental Status: She is alert and oriented to person, place, and time.   Psychiatric:         Mood and Affect: Mood normal.         Behavior: Behavior normal.         Thought Content: Thought content normal.       Suture Removal    Date/Time: 11/2/2020 4:06 PM  Performed by: Virgilio Rivas MD  Authorized by: Virgilio Rivas MD   Consent: Verbal consent obtained.  Risks and benefits: risks, benefits and alternatives were discussed  Consent given by: patient  Body area: head/neck  Location details: left eyebrow  Wound Appearance: clean  Sutures Removed: 3  Patient tolerance: patient tolerated the procedure well with no immediate complications            Assessment/ Plan  Diagnoses and all orders for this visit:    Laceration of left eyebrow, initial encounter    Fall against object    Other orders  -     Suture Removal        Return for Next scheduled follow up.      Discussion:  Lani Lopez is a 75 y.o. female RTC [place] cute [facility] (new issue to examiner) after fall > 1 week ago on tennis court. Laceration at L eyebrow with 3 stitches in ED. Stitches removed today, wound healing well with no dehiscence or signs of infection. Soap and water clean.   L wrist fx. In soft cast today. Has appt in f/u with ortho this week.     RTC as planned.

## 2020-11-04 ENCOUNTER — OFFICE VISIT (OUTPATIENT)
Dept: ORTHOPEDIC SURGERY | Facility: CLINIC | Age: 75
End: 2020-11-04

## 2020-11-04 VITALS — WEIGHT: 110 LBS | HEIGHT: 65 IN | BODY MASS INDEX: 18.33 KG/M2 | TEMPERATURE: 96.5 F

## 2020-11-04 DIAGNOSIS — S52.502A CLOSED FRACTURE OF DISTAL END OF LEFT RADIUS, UNSPECIFIED FRACTURE MORPHOLOGY, INITIAL ENCOUNTER: Primary | ICD-10-CM

## 2020-11-04 DIAGNOSIS — Z09 FRACTURE FOLLOW-UP: ICD-10-CM

## 2020-11-04 PROCEDURE — 99024 POSTOP FOLLOW-UP VISIT: CPT | Performed by: ORTHOPAEDIC SURGERY

## 2020-11-04 PROCEDURE — 73110 X-RAY EXAM OF WRIST: CPT | Performed by: ORTHOPAEDIC SURGERY

## 2020-11-04 NOTE — PROGRESS NOTES
Ms. Lopez follows up today for her left wrist.  Denies any new complaints or issues.  She has been resting and trying to get the swelling down.    Splint was in place and removed.  She has some edema at the wrist but it is really not too bad.  All compartments are soft.  Her wrist is a little edematous but there is no abnormality in terms of the contour.  No skin breakdown.  She moves her fingers well.  Intact sensation.    AP, oblique and lateral views left wrist are ordered and reviewed.  These are compared to previous x-rays.  She has a comminuted fracture.  She has a little bit of increased volar tilt due to the nature of her fracture.  Her length is about neutral.  Radial inclination looks okay.    Assessment: 1 week status post closed treatment of left distal radius fracture    Plan: I would recommend continued closed treatment here.  I do not consider that surgery could predictably improve her outcome.  A well-padded, well molded cast was placed today without complication.  Going to have her follow-up with me in 1 week for repeat x-rays.  Appropriate activity modifications and restrictions were discussed.

## 2020-11-05 ENCOUNTER — EPISODE CHANGES (OUTPATIENT)
Dept: CASE MANAGEMENT | Facility: OTHER | Age: 75
End: 2020-11-05

## 2020-11-11 ENCOUNTER — OFFICE VISIT (OUTPATIENT)
Dept: ORTHOPEDIC SURGERY | Facility: CLINIC | Age: 75
End: 2020-11-11

## 2020-11-11 VITALS — WEIGHT: 110.1 LBS | HEIGHT: 65 IN | BODY MASS INDEX: 18.34 KG/M2 | TEMPERATURE: 98.6 F

## 2020-11-11 DIAGNOSIS — S52.502A CLOSED FRACTURE OF DISTAL END OF LEFT RADIUS, UNSPECIFIED FRACTURE MORPHOLOGY, INITIAL ENCOUNTER: Primary | ICD-10-CM

## 2020-11-11 PROCEDURE — 99024 POSTOP FOLLOW-UP VISIT: CPT | Performed by: ORTHOPAEDIC SURGERY

## 2020-11-11 PROCEDURE — 73110 X-RAY EXAM OF WRIST: CPT | Performed by: ORTHOPAEDIC SURGERY

## 2020-11-11 NOTE — PROGRESS NOTES
Lani Lopez : 1945 MRN: [id number] DATE: 2020    CC: Closed treatment left distal radius fracture    HPI: The patient returns to clinic today stating pain is improving.  No complaints.  Pain is well-controlled.      Vitals:    20 1619   Temp: 98.6 °F (37 °C)       Exam:  Cast in place.  Skin intact and benign about margins of cast.  Moves fingers well and without discomfort.  Good sensory function in the fingers and thumb.  Brisk cap refill      Imaging  2v xrays including AP and lateral views of the wrist are ordered and reviewed by me to evaluate alignment and for comparison purposes.  No new or concerning findings noted. Alignment is unchanged.    Impression:  2 weeks s/p closed treatment left distal radius fracture    Plan:  The alignment appears unchanged.  Continue cast immobilization. Follow-up in 2 weeks for repeat x-rays and reevaluation.    Taye Anthony MD

## 2020-11-25 ENCOUNTER — OFFICE VISIT (OUTPATIENT)
Dept: ORTHOPEDIC SURGERY | Facility: CLINIC | Age: 75
End: 2020-11-25

## 2020-11-25 VITALS — BODY MASS INDEX: 17.69 KG/M2 | WEIGHT: 110.1 LBS | TEMPERATURE: 98.6 F | HEIGHT: 66 IN

## 2020-11-25 DIAGNOSIS — Z09 FRACTURE FOLLOW-UP: ICD-10-CM

## 2020-11-25 DIAGNOSIS — S52.502A CLOSED FRACTURE OF DISTAL END OF LEFT RADIUS, UNSPECIFIED FRACTURE MORPHOLOGY, INITIAL ENCOUNTER: Primary | ICD-10-CM

## 2020-11-25 PROCEDURE — 99024 POSTOP FOLLOW-UP VISIT: CPT | Performed by: ORTHOPAEDIC SURGERY

## 2020-11-25 PROCEDURE — 73110 X-RAY EXAM OF WRIST: CPT | Performed by: ORTHOPAEDIC SURGERY

## 2020-11-25 NOTE — PROGRESS NOTES
Ms. Lopez follows up today for her left wrist.  Is feeling much better.  She is comfortable in the cast.  No complaints today.    The cast is well fitting.  No skin breakdown.  Good motor and sensory function in her fingers.    AP, lateral and oblique views of the left wrist are ordered and reviewed.  These are compared to previous x-rays.  There appears to have a been slight settling of the volar Edmondson's fragment, a millimeter or so by by my estimate.  Otherwise, no significant change in her alignment.    Assessment: 1 month status post closed treatment of comminuted distal radius fracture    Plan: I recommend continuing cast immobilization for another couple of weeks.  Appropriate activity modification and restrictions were discussed.  I will have her follow-up with me in 2 weeks.  I plan to take the cast off at that time and convert her to a removable brace.

## 2020-12-15 ENCOUNTER — TELEPHONE (OUTPATIENT)
Dept: INTERNAL MEDICINE | Facility: CLINIC | Age: 75
End: 2020-12-15

## 2020-12-15 NOTE — TELEPHONE ENCOUNTER
PATIENT CALLED REQUESTING A CALLBACK REGARDING A COVID QUESTION. THE PATIENT WOULD LIKE TO KNOW IF IT IS SAFE TO KEEP HER DAUGHTER'S NINO RETRIEVER FOR A NIGHT. THE PATIENT STATED THAT SHE HAS HEARD THAT ANIMALS CAN HAVE COVID AND SHE JUST WANTS TO BE SAFE.    PATIENT CALLBACK: [phone]

## 2020-12-16 ENCOUNTER — OFFICE VISIT (OUTPATIENT)
Dept: ORTHOPEDIC SURGERY | Facility: CLINIC | Age: 75
End: 2020-12-16

## 2020-12-16 VITALS — TEMPERATURE: 97.3 F | HEIGHT: 65 IN | WEIGHT: 110 LBS | BODY MASS INDEX: 18.33 KG/M2

## 2020-12-16 DIAGNOSIS — S52.502A CLOSED FRACTURE OF DISTAL END OF LEFT RADIUS, UNSPECIFIED FRACTURE MORPHOLOGY, INITIAL ENCOUNTER: Primary | ICD-10-CM

## 2020-12-16 DIAGNOSIS — Z09 FRACTURE FOLLOW-UP: ICD-10-CM

## 2020-12-16 PROCEDURE — 99024 POSTOP FOLLOW-UP VISIT: CPT | Performed by: ORTHOPAEDIC SURGERY

## 2020-12-16 PROCEDURE — 73110 X-RAY EXAM OF WRIST: CPT | Performed by: ORTHOPAEDIC SURGERY

## 2020-12-16 NOTE — PROGRESS NOTES
Lani Lopez : 1945 MRN: [id number] DATE: 2020    CC: Closed treatment of left distal radius fracture    HPI: Patient returns to clinic today stating pain is improved.  No new problems or concerns.  Cast is well-fitting.    Vitals:    20 1023   Temp: 97.3 °F (36.3 °C)        Exam:  Cast is well-fitting.  No skin breakdown around the margins.  Cast was removed and the skin looks benign.  Very mild tenderness to deep palpation only.  Wrist is a little stiff as expected.  Good motor and sensory function in the fingers.  Good cap refill.      Imaging  AP, oblique and lateral views of the wrist are ordered and reviewed for comparison purposes and to evaluate healing.  X-rays show alignment unchanged.  There appears to be interval callous formation.    Impression: 6-week status post closed treatment of left distal radius fracture    Plan:  1.  Convert to removable splint.  2.  Advised patient about continuing to refrain from lifting, pushing or pulling with that arm.  3.  Follow up in 6 weeks with repeat 3v x-rays.    Taye Anthony MD    2020

## 2021-01-05 ENCOUNTER — TELEPHONE (OUTPATIENT)
Dept: INTERNAL MEDICINE | Facility: CLINIC | Age: 76
End: 2021-01-05

## 2021-01-05 NOTE — TELEPHONE ENCOUNTER
PT CALLED TO INQUIRE IF SHE AND HER  SHOULD GO AHEAD AND GET THE SECOND DOSE OF THE SHINGRIX VACCINE, WHICH THEY ARE DUE FOR IN January, RATHER THAN WAITING FOR COVID VACCINE.    PLEASE ADVISE.    CALLBACK NUMBER: [phone]

## 2021-01-27 ENCOUNTER — OFFICE VISIT (OUTPATIENT)
Dept: ORTHOPEDIC SURGERY | Facility: CLINIC | Age: 76
End: 2021-01-27

## 2021-01-27 VITALS — HEIGHT: 65 IN | TEMPERATURE: 97.2 F | BODY MASS INDEX: 18.33 KG/M2 | WEIGHT: 110 LBS

## 2021-01-27 DIAGNOSIS — Z09 FRACTURE FOLLOW-UP: ICD-10-CM

## 2021-01-27 DIAGNOSIS — S52.502A CLOSED FRACTURE OF DISTAL END OF LEFT RADIUS, UNSPECIFIED FRACTURE MORPHOLOGY, INITIAL ENCOUNTER: Primary | ICD-10-CM

## 2021-01-27 PROCEDURE — 99024 POSTOP FOLLOW-UP VISIT: CPT | Performed by: ORTHOPAEDIC SURGERY

## 2021-01-27 PROCEDURE — 73110 X-RAY EXAM OF WRIST: CPT | Performed by: ORTHOPAEDIC SURGERY

## 2021-01-27 NOTE — PROGRESS NOTES
Lani Lopez : 1945 MRN: [id number] DATE: 2021    CC: 3 months s/p closed treatment left distal radius fracture    HPI:  Patient returns to clinic today stating pain is much improved.  Reports motion is steadily improving as well.  Reports compliance with the activity restrictions as recommended.    Vitals:    21 1017   Temp: 97.2 °F (36.2 °C)       Exam:  Skin appears benign.  No gross malalignment or obvious deformity on inspection.  No tenderness on exam.  Wrist motion is improved but not quite symmetric to the contralateral side.  Good , pinch, and finger and thumb abduction strength.  Intact sensation.  Brisk cap refill.    Imaging: 3 view x-rays including AP, oblique and lateral views of the wrist are ordered and reviewed by me to evaluate alignment and for comparison purposes.  No new or concerning findings noted. There has been significant interval callous formation.      Impression:  3 months s/p closed treatment left distal radius fracture    Plan:  The alignment appears well maintained and the patient demonstrates significant evidence for both clinical and radiographic healing.  At this point, it is fine for her to discontinue use of the brace.  Okay to gradually resume activity without restriction.  I will order OT for her.    Taye Anthony MD    2021

## 2021-02-04 ENCOUNTER — HOSPITAL ENCOUNTER (OUTPATIENT)
Dept: OCCUPATIONAL THERAPY | Facility: HOSPITAL | Age: 76
Setting detail: THERAPIES SERIES
Discharge: HOME | End: 2021-02-04

## 2021-02-04 DIAGNOSIS — R29.898 LUE WEAKNESS: ICD-10-CM

## 2021-02-04 DIAGNOSIS — M25.60 DECREASED RANGE OF MOTION: ICD-10-CM

## 2021-02-04 DIAGNOSIS — S52.502A CLOSED FRACTURE OF DISTAL END OF LEFT RADIUS, UNSPECIFIED FRACTURE MORPHOLOGY, INITIAL ENCOUNTER: Primary | ICD-10-CM

## 2021-02-04 PROCEDURE — 97165 OT EVAL LOW COMPLEX 30 MIN: CPT

## 2021-02-04 NOTE — THERAPY EVALUATION
Outpatient Occupational Therapy Ortho Initial Evaluation  [facility]     Patient Name: Lani Lopez  : 1945  MRN: [id number]  Today's Date: 2021      Visit Date: 2021    Patient Active Problem List   Diagnosis   • Osteopenia of hip   • Asymptomatic varicose veins of left lower extremity   • Age-related osteoporosis without current pathological fracture        Past Medical History:   Diagnosis Date   • Osteopenia of multiple sites 10/3/2019    dx'd in ~; s/p Fosamax x 3 years stopping in    • Ovarian cyst     benign; s/p KALANI-BSO   • Psoriasis     resolved   • Shingles         Past Surgical History:   Procedure Laterality Date   • BREAST BIOPSY Left     benign   • CATARACT EXTRACTION, BILATERAL     • TOTAL ABDOMINAL HYSTERECTOMY WITH SALPINGO OOPHORECTOMY           Visit Dx:    ICD-10-CM ICD-9-CM   1. Closed fracture of distal end of left radius, unspecified fracture morphology, initial encounter  S52.502A 813.42   2. LUE weakness  R29.898 729.89   3. Decreased range of motion  M25.60 719.50       Patient History     Row Name 21 1100 21 0758          History    Chief Complaint  Other 1 (comment);Joint stiffness;Muscle weakness difficulty using LUE  -MR  Other 1 (comment);Other 2 (comment)  (Pended)   (Patient-Rptd)   -patient     Type of Pain  Wrist pain  -MR  Wrist pain  (Pended)   (Patient-Rptd)   -patient     Date Current Problem(s) Began  10/26/20  -MR  10/26/20  (Pended)   (Patient-Rptd)   -patient     Patient/Caregiver Goals  Return to prior level of function  -MR  Return to prior level of function  (Pended)   (Patient-Rptd)   -patient     Hand Dominance  right-handed  -MR  right-handed  (Pended)   (Patient-Rptd)   -patient     Occupation/sports/leisure activities  Tennis, Golf, walking  -MR  Tennis, Golf, walking  (Pended)   (Patient-Rptd)   -patient     Patient seeing anyone else for problem(s)?  No  -MR  No  (Pended)   (Patient-Rptd)   -patient      What clinical tests have you had for this problem?  X-ray  -MR  X-ray  (Pended)   (Patient-Rptd)   -patient     Are you or can you be pregnant  No  -MR  No  (Pended)   (Patient-Rptd)   -patient        Pain     Pain Location  Wrist left  -MR  --     Pain at Present  3  -MR  --        Fall Risk Assessment    Number of falls reported in the last 12 months  fell at time of injury, while playing tennis  -MR  --     Factors that contributed to the fall:  Tripped  -MR  Tripped  (Pended)   (Patient-Rptd)   -patient        Services    Prior Rehab/Home Health Experiences  No  -MR  No  (Pended)   (Patient-Rptd)   -patient     Are you currently receiving Home Health services  No  -MR  No  (Pended)   (Patient-Rptd)   -patient     Do you plan to receive Home Health services in the near future  No  -MR  No  (Pended)   (Patient-Rptd)   -patient        Daily Activities    Primary Language  English  -MR  English  (Pended)   (Patient-Rptd)   -patient     Are you able to read  Yes  -MR  Yes  (Pended)   (Patient-Rptd)   -patient     Are you able to write  Yes  -MR  Yes  (Pended)   (Patient-Rptd)   -patient     How does patient learn best?  Listening;Reading;Demonstration;Pictures/Video  -MR  Listening;Reading;Demonstration;Pictures/Video  (Pended)   (Patient-Rptd)   -patient     Pt Participated in POC and Goals  Yes  -MR  --        Safety    Are you being hurt, hit, or frightened by anyone at home or in your life?  No  -MR  No  (Pended)   (Patient-Rptd)   -patient     Are you being neglected by a caregiver  No  -MR  No  (Pended)   (Patient-Rptd)   -patient     Have you had any of the following issues with  N/A  -MR  N/A  (Pended)   (Patient-Rptd)   -patient       User Key  (r) = Recorded By, (t) = Taken By, (c) = Cosigned By    Initials Name Provider Type     Regan Josefina Corcoran, OT Occupational Therapist    patient Lani Lopez --          OT Ortho     Row Name 02/04/21 1100             Subjective Comments    Subjective Comments   Pt reports she has not been using LUE much for functional tasks since her injury in October.  -MR         Precautions and Contraindications    Precautions  per most recent Ortho MD note: pt to gradually resume activity without restriction  -MR         Subjective Pain    Able to rate subjective pain?  yes  -MR      Pre-Treatment Pain Level  3  -MR      Subjective Pain Comment  L wrist  -MR         Sensation    Additional Comments  pt reports intact sensation with BUEs  -MR         General ROM    RT Upper Ext  Comments  -MR      LT Upper Ext  Comment;Lt Wrist Flexion;Lt Wrist Extension  -MR         Right Upper Ext    Rt Upper Extremity Comments   RUE AROM WFL  -MR         Left Upper Ext    Lt Wrist Flexion AROM  15 degrees  -MR      Lt Wrist Flexion PROM  25 degrees  -MR      Lt Wrist Extension AROM  20 degrees  -MR      Lt Wrist Extension PROM  33 degrees  -MR      Lt Upper Extremity Comments   L shoulder, elbow and hand WFL  -MR         MMT (Manual Muscle Testing)    Rt Upper Ext  Comments  -MR      Lt Upper Ext  Comments  -MR         MMT Right Upper Ext    Rt Upper Extremity Comments   RUE strength WFL  -MR         MMT Left Upper Ext    Lt Upper Extremity Comments   L shoulder WFL; L elbow 4/5; L wrist 4/5  -MR        User Key  (r) = Recorded By, (t) = Taken By, (c) = Cosigned By    Initials Name Provider Type    MR Josefina Spears, OT Occupational Therapist        OT Neuro     Row Name 02/04/21 1100             Coordination    Coordination Tests  9-Hole Peg  -MR      9-Hole Peg Left  23 sec  -MR      9-Hole Peg Right  21 sec  -MR         ADL Assessment/Intervention    ADL's Assessed?  Upper Body Bathing;Upper Body Dressing;Lower Body Dressing;Toileting;Grooming  -MR      Comment, IADL Assessment/Training  pt unable to open containers, receives assist from   -MR         Bathing Assessment/Intervention    [place] Level (Bathing)  modified independence  -MR         Upper Body Dressing  Assessment/Training    [place] Level (Upper Body Dressing)  modified independence  -MR         Lower Body Dressing Assessment/Training    [place] Level (Lower Body Dressing)  minimum assist (75% patient effort)  -MR      Comment (Lower Body Dressing)  pt receives min assist from  to pull up tight/fitted garments  -MR         Toileting Assessment/Training    [place] Level (Toileting)  modified independence  -MR         Grooming Assessment/Training    [place] Level (Grooming)  modified independence  -MR        User Key  (r) = Recorded By, (t) = Taken By, (c) = Cosigned By    Initials Name Provider Type    Josefina King, OT Occupational Therapist           Hand Therapy (last 24 hours)      Hand Eval     Row Name 02/04/21 1400             Hand  Strength     Strength Affected Side  Bilateral  -MR          Strength Right    # Reps  3  -MR      Right Rung  2  -MR      Right  Test 1  34  -MR      Right  Test 2  35  -MR      Right  Test 3  30  -MR       Strength Average Right  33  -MR          Strength Left    # Reps  3  -MR      Left Rung  2  -MR      Left  Test 1  6  -MR      Left  Test 2  5  -MR      Left  Test 3  7  -MR       Strength Average Left  6  -MR         Pinch Strength    Affected Side  Bilateral  -MR         Right Hand Strength - Pinch (lbs)    Lateral  10 lbs  -MR         Left Hand Strength - Pinch (lbs)    Lateral  7 lbs  -MR         Therapy Education    Education Details  results of OT eval; OT POC; ROM for LUE  -MR      Given  HEP;Symptoms/condition management  -MR      Program  New  -MR      How Provided  Verbal;Demonstration;Written  -MR      Provided to  Patient  -MR      Level of Understanding  Teach back education performed;Verbalized;Demonstrated  -MR        User Key  (r) = Recorded By, (t) = Taken By, (c) = Cosigned By    Initials Name Provider Type    Josefina King, OT Occupational Therapist               Therapy Education  Education Details: results of OT eval; OT POC; ROM for LUE  Given: HEP, Symptoms/condition management  Program: New  How Provided: Verbal, Demonstration, Written  Provided to: Patient  Level of Understanding: Teach back education performed, Verbalized, Demonstrated    OT Goals     Row Name 02/04/21 1400          OT Short Term Goals    STG Date to Achieve  03/06/21  -MR     STG 1  Pt to be independent with LUE ROM home exercise program.  -MR     STG 2  Pt to be independent with LUE strengthening home exercise program.  -MR        Long Term Goals    LTG Date to Achieve  03/21/21  -MR     LTG 1  Pt to open containers with MOD I, using adaptive strategies as needed for increased independence with daily functional tasks.  -MR     LTG 2  Pt to  household items (e.g., dishes, clothing, grooming items, etc.) using LUE for increased participation and independence with ADL/IADLs.  -MR     LTG 3  Pt to complete LB dressing tasks with MOD I to increase independence with self care skills.  -MR     LTG 4  Pt to perform simple meal prep with MOD I to increase functional use of LUE with daily tasks.  -MR     LTG 5  Pt will complete household management tasks (e.g., dusting, sweeping, etc.) with MOD I, for increased independence and participation with IADLs.  -MR        Time Calculation    OT Goal Re-Cert Due Date  03/06/21  -MR       User Key  (r) = Recorded By, (t) = Taken By, (c) = Cosigned By    Initials Name Provider Type    MR Spears Josefina Corcoran, OT Occupational Therapist          OT Assessment/Plan     Row Name 02/04/21 1503          OT Assessment    Functional Limitations  Limitations in functional capacity and performance;Performance in leisure activities;Performance in self-care ADL;Limitation in home management;Limitations in community activities  -MR     Impairments  Range of motion;Muscle strength;Endurance  -MR     Assessment Comments  Pt is a 75 y.o. female who fell while playing  tennis, and sustained a closed distal radius fracture of her LUE on 10/26/20. Pt underwent closed treatment of her fracture. She was recently seen by orthopedic MD for a follow-up on 1/27/21; per MD notes pt is able to discontinue use of removable brace and gradually resume activity without restriction. Pt currently presents for initial outpatient OT eval. Upon assessment pt noted with stiffness, decreased ROM, and weakness of L forearm, wrist and hand. Pt reports she has been reluctant to use her LUE during functional activities since the time of her injury. Pt currently receives assist from her  for tasks such as opening containers and LB dressing. OT encouraged pt to begin gradually incorporating use of LUE into daily tasks, as per MD notes pt is able to gradually resume activity without restriction. Pt seen in person for initial evaluation, however has requested therapy visits to be conducted virtually due to COVID-19, therefore subsequent visits are planned to be conducted virtually at this time. Recommend outpatient OT services to address above mentioned impairments for improved participation and independence with daily functional tasks.  -MR     OT Diagnosis  impaired functional use of LUE  -MR     OT Rehab Potential  Good  -MR     Patient/caregiver participated in establishment of treatment plan and goals  Yes  -MR     Patient would benefit from skilled therapy intervention  Yes  -MR        OT Plan    OT Frequency  1x/week  -MR     Predicted Duration of Therapy Intervention (OT)  6 weeks  -MR     Planned CPT's?  OT EVAL LOW COMPLEXITY: 42366;OT THER PROC EA 15 MIN: 95092KC;OT THER ACT EA 15 MIN: 77658VD;OT SELF CARE/MGMT/TRAIN 15 MIN: 61560;OT NEUROMUSC RE EDUCATION EA 15 MIN: 40140  -MR     Planned Therapy Interventions (Optional Details)  home exercise program;patient/family education;ROM (Range of Motion);strengthening;stretching  -MR       User Key  (r) = Recorded By, (t) = Taken By, (c) =  Cosigned By    Initials Name Provider Type    MR Spears Isabelle, OT Occupational Therapist                 Outcome Measure Options: Disabilities of the Arm, Shoulder, and Hand (DASH)  9 Hole Peg  9-Hole Peg Left: 23 sec  9-Hole Peg Right: 21 sec  DASH  Open a tight or new jar.: Moderate Difficulty  Write: No Difficulty  Turn a key: No Difficulty  Prepare a meal: No Difficulty  Push open a heavy door: Moderate Difficulty  Place an object on a shelf above your head: No Difficulty  Do heavy household chores (e.g., wash walls, wash floors): Moderate Difficulty  Garden or do yard work: Unable  Make a bed: Severe Difficulty  Carry a shopping bag or briefcase: Unable  Carry a heavy object (over 10 lbs): Unable  Change a lightbulb overhead: Mild Difficulty  Wash or blow dry your hair: Moderate Difficulty  Wash your back: No Difficulty  Put on a pullover sweater: Mild Difficulty  Use a knife to cut food: Mild Difficulty  Recreational activities in which require little effort (e.g., cardplaying, knitting, etc.): No Difficulty  Recreational activities in which you take some force or impact through your arm, should or hand (e.g. golf, hammering, tennis, etc.): Severe Difficulty  Recreational Activities in which you move your arm freely (e.g., frisbee, badminton, etc.): No Difficulty  Manage transportation needs (getting from one place to another): No Difficulty  During the past week, to what extent has your arm, shoulder, or hand problem interfered with your normal social activites with family, friends, neighbors or groups?: Not at all  During the past week, were you limited in your work or other regular daily activities as a result of your arm, shoulder or hand problem?: Moderately Limited  Arm, Shoulder, or hand pain: Moderate  Arm, shoulder or hand pain when you performed any specific activity: Mild  Tingling (pins and needles) in your arm, shoulder, or hand: None  Weakness in your arm, shoulder or hand: Mild  Stiffness  in your arm, shoulder or hand: Extreme  During the past week, how much difficulty have you had sleeping because of the pain in your arm, shoulder or hand?: No difficulty  I feel less capable, less confident or less useful because of my arm, shoulder or hand problem: Disagree  DASH Sum : 69  Number of Questions Answered: 29  DASH Score: 34.48         Time Calculation:    OT Start Time: 0845  OT Stop Time: 0930  OT Time Calculation (min): 45 min     Therapy Charges for Today     Code Description Service Date Service Provider Modifiers Qty    [id number]  OT EVAL LOW COMPLEXITY 3 2/4/2021 Josefina Spears, OT GO 1              Patient was wearing a face mask during this therapy encounter. Therapist used appropriate personal protective equipment including mask and eye protection.  Mask used was standard procedure mask. Appropriate PPE was worn during the entire therapy session. Hand hygiene was completed before and after therapy session. Patient is not in enhanced droplet precautions.            Josefina Spears, OT  2/4/2021

## 2021-02-11 ENCOUNTER — APPOINTMENT (OUTPATIENT)
Dept: OCCUPATIONAL THERAPY | Facility: HOSPITAL | Age: 76
End: 2021-02-11

## 2021-02-18 ENCOUNTER — HOSPITAL ENCOUNTER (OUTPATIENT)
Dept: OCCUPATIONAL THERAPY | Facility: HOSPITAL | Age: 76
Setting detail: THERAPIES SERIES
Discharge: HOME | End: 2021-02-18

## 2021-02-18 DIAGNOSIS — M25.60 DECREASED RANGE OF MOTION: ICD-10-CM

## 2021-02-18 DIAGNOSIS — S52.502A CLOSED FRACTURE OF DISTAL END OF LEFT RADIUS, UNSPECIFIED FRACTURE MORPHOLOGY, INITIAL ENCOUNTER: Primary | ICD-10-CM

## 2021-02-18 DIAGNOSIS — R29.898 LUE WEAKNESS: ICD-10-CM

## 2021-02-18 PROCEDURE — 97110 THERAPEUTIC EXERCISES: CPT

## 2021-02-18 NOTE — THERAPY TREATMENT NOTE
Outpatient Occupational Therapy Ortho Treatment Note  [facility]     Patient Name: Lani Lopez  : 1945  MRN: [id number]  Today's Date: 2021        Visit Date: 2021    Patient Active Problem List   Diagnosis   • Osteopenia of hip   • Asymptomatic varicose veins of left lower extremity   • Age-related osteoporosis without current pathological fracture        Past Medical History:   Diagnosis Date   • Osteopenia of multiple sites 10/3/2019    dx'd in ~; s/p Fosamax x 3 years stopping in    • Ovarian cyst     benign; s/p KALANI-BSO   • Psoriasis     resolved   • Shingles         Past Surgical History:   Procedure Laterality Date   • BREAST BIOPSY Left     benign   • CATARACT EXTRACTION, BILATERAL     • TOTAL ABDOMINAL HYSTERECTOMY WITH SALPINGO OOPHORECTOMY           Visit Dx:    ICD-10-CM ICD-9-CM   1. Closed fracture of distal end of left radius, unspecified fracture morphology, initial encounter  S52.502A 813.42   2. LUE weakness  R29.898 729.89   3. Decreased range of motion  M25.60 719.50                   Therapy Education  Given: HEP  Program: Progressed(ROM exercises for LUE; strengthening for LUE using theraputty, slo foam, and hand weight)  How Provided: Verbal, Demonstration, Written  Provided to: Patient  Level of Understanding: Teach back education performed, Verbalized, Demonstrated    OT Assessment/Plan     Row Name 21 5953          OT Assessment    Assessment Comments  Pt seen for first treatment session following initial evaluation this date. Pt reports compliance with home exercises are recommended by OT. Pt also reports increased use of LUE during functional activities, such as lifting items while grocery shopping.  -MR       User Key  (r) = Recorded By, (t) = Taken By, (c) = Cosigned By    Initials Name Provider Type     Regan Josefina Corcoran, OT Occupational Therapist                 OT Exercises     Row Name 21 1200             Subjective  "Comments    Subjective Comments  \"I think I've been making some good progress.\"  -MR         Subjective Pain    Able to rate subjective pain?  yes  -MR      Pre-Treatment Pain Level  0  -MR      Subjective Pain Comment  L wrist  -MR         Exercise 1    Exercise Name 1  ROM exercises for LUE. Following OT demo/cues pt performs ROM and stretching for L wrist including wrist flexion/extension, wrist circles, forearm pronation/supination. Pt performs wrist flexion/extension with flexed and extended elbow. OT provides demo and cues for proper technique.  -MR      Cueing 1  Verbal;Demo  -MR         Exercise 2    Exercise Name 2  Pt performs exercises to increase strength with L hand using theraputty. Pt performs various  and pinch exercises including gross grasp, thumb flexion, lateral pinch, lumbrical pinch and opposition pinches.  -MR      Cueing 2  Verbal;Demo  -MR      Equipment 2  Theraputty  -MR      Resistance 2  Yellow;Red  -MR         Exercise 3    Exercise Name 3   and pinch strengthening using slo foam.  -MR      Cueing 3  Verbal;Demo  -MR      Equipment 3  -- slo foam  -MR      Resistance 3  Blue  -MR         Exercise 4    Exercise Name 4  LUE strengthening. Using hand weight, pt performs wrist extension, wrist flexion, radial deviation, forearm pronation/supination. Additionally pt performs stretch and hold for L wrist flexion/extension while holding weight off edge of table.  -MR      Cueing 4  Verbal;Demo  -MR      Equipment 4  Dumbell  -MR      Weights/Plates 4  3  -MR      Sets 4  3  -MR      Reps 4  10  -MR        User Key  (r) = Recorded By, (t) = Taken By, (c) = Cosigned By    Initials Name Provider Type     ReganJosefina, OT Occupational Therapist                        Time Calculation:   OT Start Time: 1255  OT Stop Time: 1349  OT Time Calculation (min): 54 min  Total Timed Code Minutes- OT: 54 minute(s)     Therapy Charges for Today     Code Description Service Date Service " Provider Modifiers Qty    [id number]  OT THER PROC EA 15 MIN 2/18/2021 Josefina Spears, OT GO 4              Patient was wearing a face mask during this therapy encounter. Therapist used appropriate personal protective equipment including mask and eye protection.  Mask used was standard procedure mask. Appropriate PPE was worn during the entire therapy session. Hand hygiene was completed before and after therapy session. Patient is not in enhanced droplet precautions.              Josefina Spears OT  2/18/2021

## 2021-02-25 ENCOUNTER — HOSPITAL ENCOUNTER (OUTPATIENT)
Dept: OCCUPATIONAL THERAPY | Facility: HOSPITAL | Age: 76
Setting detail: THERAPIES SERIES
Discharge: HOME | End: 2021-02-25

## 2021-02-25 DIAGNOSIS — M25.60 DECREASED RANGE OF MOTION: ICD-10-CM

## 2021-02-25 DIAGNOSIS — S52.502A CLOSED FRACTURE OF DISTAL END OF LEFT RADIUS, UNSPECIFIED FRACTURE MORPHOLOGY, INITIAL ENCOUNTER: Primary | ICD-10-CM

## 2021-02-25 DIAGNOSIS — R29.898 LUE WEAKNESS: ICD-10-CM

## 2021-02-25 PROCEDURE — 97110 THERAPEUTIC EXERCISES: CPT

## 2021-02-25 NOTE — THERAPY TREATMENT NOTE
Outpatient Occupational Therapy Ortho Treatment Note  [facility]     Patient Name: Lani Lopez  : 1945  MRN: [id number]  Today's Date: 2021        Visit Date: 2021    Patient Active Problem List   Diagnosis   • Osteopenia of hip   • Asymptomatic varicose veins of left lower extremity   • Age-related osteoporosis without current pathological fracture        Past Medical History:   Diagnosis Date   • Osteopenia of multiple sites 10/3/2019    dx'd in ~; s/p Fosamax x 3 years stopping in    • Ovarian cyst     benign; s/p KALANI-BSO   • Psoriasis     resolved   • Shingles         Past Surgical History:   Procedure Laterality Date   • BREAST BIOPSY Left     benign   • CATARACT EXTRACTION, BILATERAL     • TOTAL ABDOMINAL HYSTERECTOMY WITH SALPINGO OOPHORECTOMY           Visit Dx:    ICD-10-CM ICD-9-CM   1. Closed fracture of distal end of left radius, unspecified fracture morphology, initial encounter  S52.502A 813.42   2. LUE weakness  R29.898 729.89   3. Decreased range of motion  M25.60 719.50                   Therapy Education  Given: HEP  Program: Reinforced, Progressed(reviewed ROM and stretching; progressed to include gentle weight bearing through LUE for wrist extension stretch, and using hand weight for forearm supination stretch)  How Provided: Verbal, Demonstration, Written  Provided to: Patient  Level of Understanding: Teach back education performed, Verbalized, Demonstrated    OT Assessment/Plan     Row Name 21 1409          OT Assessment    Assessment Comments  Pt reports she is pleased with the progress she is making with LUE since beginning outpatient OT. Pt states she is using L hand during daily functional tasks more easily and naturally, and noticing improved strength with extremity. Continue OT treatment to further LUE strength and ROM for improved functional use s/p wrist fracture.  -MR       User Key  (r) = Recorded By, (t) = Taken By, (c) =  "Cosigned By    Initials Name Provider Type    Josefnia King, OT Occupational Therapist                 OT Exercises     Row Name 02/25/21 1300             Subjective Comments    Subjective Comments  \"I feel like I've made a lot of headway this week.\"  -MR         Subjective Pain    Able to rate subjective pain?  yes  -MR      Pre-Treatment Pain Level  0  -MR         Exercise 1    Exercise Name 1  ROM exercises for LUE. Pt performs stretching/ROM for wrist flexion/extension, performs with flexed and extended elbow, as well as prayer stretch. Performs place and hold for radial/ulnar deviation. Pt also shown stretch for L wrist extension while performing gentle weight bearing through L hand.   -MR      Cueing 1  Verbal;Demo  -MR         Exercise 2    Exercise Name 2  LUE therapeutic exercise. Using 3 lb hand weight pt performs bicep curls, forearm pronation/supination, wrist flexion/extension. Pt then performs stretches off the edge of table for wrist flexion/extension, and forearm supination, using hand weight for resistance.  -MR      Cueing 2  Verbal;Demo  -MR      Equipment 2  Dumbell  -MR      Weights/Plates 2  3  -MR      Sets 2  3  -MR      Reps 2  10  -MR         Exercise 3    Exercise Name 3   and pinch strengthening using slo foam. Pt performs  and pinch exercises following OT demo/cues.  -MR      Cueing 3  Verbal;Demo  -MR      Equipment 3  -- slo foam  -MR      Resistance 3  Blue  -MR         Exercise 4    Exercise Name 4  Activity to increase strength with L hand for improved functional use. Using clothespins, pt pinches to open with L hand and attaches to vertical dowels following OT demo/cues. Mulitple reps donning/doffing clothespins completed.  -MR      Cueing 4  Verbal;Demo  -MR        User Key  (r) = Recorded By, (t) = Taken By, (c) = Cosigned By    Initials Name Provider Type    Josefina King, OT Occupational Therapist                        Time Calculation:   OT Start Time: " 1300  OT Stop Time: 1354  OT Time Calculation (min): 54 min  Total Timed Code Minutes- OT: 54 minute(s)     Therapy Charges for Today     Code Description Service Date Service Provider Modifiers Qty    [id number] HC OT THER PROC EA 15 MIN 2/25/2021 Josefina Spears, ALIYA GO 4              Patient was wearing a face mask during this therapy encounter. Therapist used appropriate personal protective equipment including mask and eye protection.  Mask used was standard procedure mask. Appropriate PPE was worn during the entire therapy session. Hand hygiene was completed before and after therapy session. Patient is not in enhanced droplet precautions.              Josefina Spears OT  2/25/2021

## 2021-03-04 ENCOUNTER — APPOINTMENT (OUTPATIENT)
Dept: OCCUPATIONAL THERAPY | Facility: HOSPITAL | Age: 76
End: 2021-03-04

## 2021-03-04 DIAGNOSIS — Z23 IMMUNIZATION DUE: ICD-10-CM

## 2021-03-11 ENCOUNTER — HOSPITAL ENCOUNTER (OUTPATIENT)
Dept: OCCUPATIONAL THERAPY | Facility: HOSPITAL | Age: 76
Setting detail: THERAPIES SERIES
Discharge: HOME | End: 2021-03-11

## 2021-03-11 DIAGNOSIS — S52.502A CLOSED FRACTURE OF DISTAL END OF LEFT RADIUS, UNSPECIFIED FRACTURE MORPHOLOGY, INITIAL ENCOUNTER: Primary | ICD-10-CM

## 2021-03-11 DIAGNOSIS — M25.60 DECREASED RANGE OF MOTION: ICD-10-CM

## 2021-03-11 DIAGNOSIS — R29.898 LUE WEAKNESS: ICD-10-CM

## 2021-03-11 PROCEDURE — 97110 THERAPEUTIC EXERCISES: CPT

## 2021-03-11 NOTE — THERAPY DISCHARGE NOTE
Outpatient Occupational Therapy Ortho Progress Note/Discharge Summary  [facility]     Patient Name: Lani Lopez  : 1945  MRN: [id number]  Today's Date: 3/11/2021        Visit Date: 2021    Patient Active Problem List   Diagnosis   • Osteopenia of hip   • Asymptomatic varicose veins of left lower extremity   • Age-related osteoporosis without current pathological fracture        Past Medical History:   Diagnosis Date   • Osteopenia of multiple sites 10/3/2019    dx'd in ~; s/p Fosamax x 3 years stopping in    • Ovarian cyst     benign; s/p KALANI-BSO   • Psoriasis     resolved   • Shingles         Past Surgical History:   Procedure Laterality Date   • BREAST BIOPSY Left     benign   • CATARACT EXTRACTION, BILATERAL     • TOTAL ABDOMINAL HYSTERECTOMY WITH SALPINGO OOPHORECTOMY           Visit Dx:    ICD-10-CM ICD-9-CM   1. Closed fracture of distal end of left radius, unspecified fracture morphology, initial encounter  S52.502A 813.42   2. LUE weakness  R29.898 729.89   3. Decreased range of motion  M25.60 719.50       OT Ortho     Row Name 21 1300             Subjective Pain    Able to rate subjective pain?  yes  -MR      Subjective Pain Comment  L wrist  -MR         Sensation    Additional Comments  pt reports intact sensations with BUEs  -MR         Right Upper Ext    Rt Upper Extremity Comments   RUE AROM WFL  -MR         Left Upper Ext    Lt Wrist Flexion AROM  38 degrees  -MR      Lt Wrist Extension AROM  40 degrees  -MR      Lt Upper Extremity Comments   L shoulder and elbow WFL; L wrist 4 to 4+/5  -MR         MMT Right Upper Ext    Rt Upper Extremity Comments   RUE strength WFL  -MR         MMT Left Upper Ext    Lt Upper Extremity Comments   L shoulder, elbow and wrist WFL  -MR        User Key  (r) = Recorded By, (t) = Taken By, (c) = Cosigned By    Initials Name Provider Type     Josefina Spears, OT Occupational Therapist        OT Neuro     Row Name  03/11/21 1300             ADL Assessment/Intervention    Comment, IADL Assessment/Training  pt reports she is able to open containers with MOD I, and is able to use LUE for carrying objects such as bags of groceries  -MR         Bathing Assessment/Intervention    Rosebud Level (Bathing)  modified independence  -MR         Upper Body Dressing Assessment/Training    Rosebud Level (Upper Body Dressing)  modified independence  -MR         Lower Body Dressing Assessment/Training    Rosebud Level (Lower Body Dressing)  modified independence  -MR         Toileting Assessment/Training    Rosebud Level (Toileting)  modified independence  -MR         Grooming Assessment/Training    [place] Level (Grooming)  modified independence  -MR        User Key  (r) = Recorded By, (t) = Taken By, (c) = Cosigned By    Initials Name Provider Type    Josefina King, OT Occupational Therapist           Hand Therapy (last 24 hours)      Hand Eval     Row Name 03/11/21 1300              Strength Left    # Reps  3  -MR      Left Rung  2  -MR      Left  Test 1  19  -MR      Left  Test 2  20  -MR      Left  Test 3  19  -MR       Strength Average Left  19.33  -MR        User Key  (r) = Recorded By, (t) = Taken By, (c) = Cosigned By    Initials Name Provider Type    Josefina King, OT Occupational Therapist              Therapy Education  Given: HEP  Program: Reinforced (strengthening and ROM for LUE)  How Provided: Verbal, Demonstration  Provided to: Patient  Level of Understanding: Teach back education performed, Verbalized, Demonstrated    OT Assessment/Plan     Row Name 03/11/21 1344          OT Assessment    Assessment Comments  Pt seen for final OT session this date. Pt has made excellent progress since beginning outpatient OT, demonstrates improved strength and ROM with L UE s/p wrist fracture. Pt reports she has been able to resume her normal activities and is ready for discharge.  "Pt has met all OT goals at this time, encouraged to continue with home exercises.  -MR       User Key  (r) = Recorded By, (t) = Taken By, (c) = Cosigned By    Initials Name Provider Type    Josefina King, OT Occupational Therapist           OT Goals     Row Name 03/11/21 1300          OT Short Term Goals    STG Date to Achieve  03/06/21  -MR     STG 1  Pt to be independent with LUE ROM home exercise program.  -MR     STG 1 Progress  Met  -MR     STG 2  Pt to be independent with LUE strengthening home exercise program.  -MR     STG 2 Progress  Met  -MR        Long Term Goals    LTG Date to Achieve  03/21/21  -MR     LTG 1  Pt to open containers with MOD I, using adaptive strategies as needed for increased independence with daily functional tasks.  -MR     LTG 1 Progress  Met  -MR     LTG 2  Pt to  household items (e.g., dishes, clothing, grooming items, etc.) using LUE for increased participation and independence with ADL/IADLs.  -MR     LTG 2 Progress  Met  -MR     LTG 3  Pt to complete LB dressing tasks with MOD I to increase independence with self care skills.  -MR     LTG 3 Progress  Met  -MR     LTG 4  Pt to perform simple meal prep with MOD I to increase functional use of LUE with daily tasks.  -MR     LTG 4 Progress  Met  -MR     LTG 5  Pt will complete household management tasks (e.g., dusting, sweeping, etc.) with MOD I, for increased independence and participation with IADLs.  -MR     LTG 5 Progress  Met  -MR       User Key  (r) = Recorded By, (t) = Taken By, (c) = Cosigned By    Initials Name Provider Type    Josefina King, OT Occupational Therapist              OT Exercises     Row Name 03/11/21 1300             Subjective Comments    Subjective Comments  \"I was able to play tennis while we were out of town, my arm was a little sore afterwards.\" \"I'm going at full speed now, before I was about 50%.\"  -MR         Exercise 1    Exercise Name 1  LUE therapeutic exercise. Using hand " weights pt performs wrist extension, wrist flexion, forearm pronation/supination, bicep curls.  -MR      Cueing 1  Verbal;Demo  -MR      Equipment 1  Dumbell  -MR      Weights/Plates 1  3  -MR      Sets 1  3  -MR      Reps 1  10  -MR         Exercise 2    Exercise Name 2  ROM and stretching for LUE. Pt performs stretches for wrist flexion and extension, including prayer and reverse prayer, and with elbow extended. Pt also performs stretch for forearm supination, using 2 lb hand weight for additional stretch.  -MR      Cueing 2  Verbal;Demo  -MR        User Key  (r) = Recorded By, (t) = Taken By, (c) = Cosigned By    Initials Name Provider Type    Josefina King OT Occupational Therapist                        Time Calculation:   OT Start Time: 1300  OT Stop Time: 1342  OT Time Calculation (min): 42 min  Total Timed Code Minutes- OT: 42 minute(s)     Therapy Charges for Today     Code Description Service Date Service Provider Modifiers Qty    [id number] HC OT THER PROC EA 15 MIN 3/11/2021 Josefina Spears OT GO 3                   Patient was wearing a face mask during this therapy encounter. Therapist used appropriate personal protective equipment including mask and eye protection.  Mask used was standard procedure mask. Appropriate PPE was worn during the entire therapy session. Hand hygiene was completed before and after therapy session. Patient is not in enhanced droplet precautions.              Josefina Spears OT  3/11/2021

## 2021-03-18 ENCOUNTER — APPOINTMENT (OUTPATIENT)
Dept: OCCUPATIONAL THERAPY | Facility: HOSPITAL | Age: 76
End: 2021-03-18

## 2021-03-25 ENCOUNTER — APPOINTMENT (OUTPATIENT)
Dept: OCCUPATIONAL THERAPY | Facility: HOSPITAL | Age: 76
End: 2021-03-25

## 2021-07-07 ENCOUNTER — TELEPHONE (OUTPATIENT)
Dept: INTERNAL MEDICINE | Facility: CLINIC | Age: 76
End: 2021-07-07

## 2021-07-07 NOTE — TELEPHONE ENCOUNTER
PATIENT FORGOT TO SCHEDULE HER MWV AS SHE WAS LEAVING THE OFFICE AFTER HER LAST MWV AND NOW HER  IS SCHEDULED ROUTINELY IN September BUT THE NEXT AVAILABLE FOR HER IS 12/02 WHICH WE SCHEDULED HOWEVER IS THERE ANYWAY SHE CAN GET IN SOONER??       PATIENT CAN BE REACHED AT: [phone]

## 2021-08-09 ENCOUNTER — HOSPITAL ENCOUNTER (EMERGENCY)
Facility: HOSPITAL | Age: 76
Discharge: HOME | End: 2021-08-10
Attending: EMERGENCY MEDICINE | Admitting: EMERGENCY MEDICINE

## 2021-08-09 ENCOUNTER — APPOINTMENT (OUTPATIENT)
Dept: CT IMAGING | Facility: HOSPITAL | Age: 76
End: 2021-08-09

## 2021-08-09 ENCOUNTER — TELEPHONE (OUTPATIENT)
Dept: INTERNAL MEDICINE | Facility: CLINIC | Age: 76
End: 2021-08-09

## 2021-08-09 DIAGNOSIS — J18.9 ATYPICAL PNEUMONIA: Primary | ICD-10-CM

## 2021-08-09 LAB
ALBUMIN SERPL-MCNC: 4.6 G/DL (ref 3.5–5.2)
ALBUMIN/GLOB SERPL: 1.4 G/DL
ALP SERPL-CCNC: 78 U/L (ref 39–117)
ALT SERPL W P-5'-P-CCNC: 17 U/L (ref 1–33)
ANION GAP SERPL CALCULATED.3IONS-SCNC: 11.1 MMOL/L (ref 5–15)
APTT PPP: 27.8 SECONDS (ref 22.7–35.4)
AST SERPL-CCNC: 25 U/L (ref 1–32)
BASOPHILS # BLD AUTO: 0.06 10*3/MM3 (ref 0–0.2)
BASOPHILS NFR BLD AUTO: 0.5 % (ref 0–1.5)
BILIRUB SERPL-MCNC: 0.2 MG/DL (ref 0–1.2)
BUN SERPL-MCNC: 12 MG/DL (ref 8–23)
BUN/CREAT SERPL: 17.1 (ref 7–25)
CALCIUM SPEC-SCNC: 9.6 MG/DL (ref 8.6–10.5)
CHLORIDE SERPL-SCNC: 101 MMOL/L (ref 98–107)
CO2 SERPL-SCNC: 26.9 MMOL/L (ref 22–29)
CREAT SERPL-MCNC: 0.7 MG/DL (ref 0.57–1)
DEPRECATED RDW RBC AUTO: 39.6 FL (ref 37–54)
EOSINOPHIL # BLD AUTO: 0.1 10*3/MM3 (ref 0–0.4)
EOSINOPHIL NFR BLD AUTO: 0.9 % (ref 0.3–6.2)
ERYTHROCYTE [DISTWIDTH] IN BLOOD BY AUTOMATED COUNT: 12 % (ref 12.3–15.4)
GFR SERPL CREATININE-BSD FRML MDRD: 82 ML/MIN/1.73
GLOBULIN UR ELPH-MCNC: 3.4 GM/DL
GLUCOSE SERPL-MCNC: 96 MG/DL (ref 65–99)
HCT VFR BLD AUTO: 40.1 % (ref 34–46.6)
HGB BLD-MCNC: 13.8 G/DL (ref 12–15.9)
IMM GRANULOCYTES # BLD AUTO: 0.03 10*3/MM3 (ref 0–0.05)
IMM GRANULOCYTES NFR BLD AUTO: 0.3 % (ref 0–0.5)
INR PPP: 0.99 (ref 0.9–1.1)
LYMPHOCYTES # BLD AUTO: 2 10*3/MM3 (ref 0.7–3.1)
LYMPHOCYTES NFR BLD AUTO: 17.5 % (ref 19.6–45.3)
MCH RBC QN AUTO: 31.5 PG (ref 26.6–33)
MCHC RBC AUTO-ENTMCNC: 34.4 G/DL (ref 31.5–35.7)
MCV RBC AUTO: 91.6 FL (ref 79–97)
MONOCYTES # BLD AUTO: 0.77 10*3/MM3 (ref 0.1–0.9)
MONOCYTES NFR BLD AUTO: 6.7 % (ref 5–12)
NEUTROPHILS NFR BLD AUTO: 74.1 % (ref 42.7–76)
NEUTROPHILS NFR BLD AUTO: 8.45 10*3/MM3 (ref 1.7–7)
NRBC BLD AUTO-RTO: 0 /100 WBC (ref 0–0.2)
PLATELET # BLD AUTO: 291 10*3/MM3 (ref 140–450)
PMV BLD AUTO: 11.2 FL (ref 6–12)
POTASSIUM SERPL-SCNC: 3.8 MMOL/L (ref 3.5–5.2)
PROT SERPL-MCNC: 8 G/DL (ref 6–8.5)
PROTHROMBIN TIME: 12.9 SECONDS (ref 11.7–14.2)
RBC # BLD AUTO: 4.38 10*6/MM3 (ref 3.77–5.28)
SODIUM SERPL-SCNC: 139 MMOL/L (ref 136–145)
WBC # BLD AUTO: 11.41 10*3/MM3 (ref 3.4–10.8)

## 2021-08-09 PROCEDURE — 99283 EMERGENCY DEPT VISIT LOW MDM: CPT

## 2021-08-09 PROCEDURE — 71275 CT ANGIOGRAPHY CHEST: CPT

## 2021-08-09 PROCEDURE — 0 IOPAMIDOL PER 1 ML: Performed by: EMERGENCY MEDICINE

## 2021-08-09 PROCEDURE — 85730 THROMBOPLASTIN TIME PARTIAL: CPT | Performed by: EMERGENCY MEDICINE

## 2021-08-09 PROCEDURE — 80053 COMPREHEN METABOLIC PANEL: CPT | Performed by: EMERGENCY MEDICINE

## 2021-08-09 PROCEDURE — 85610 PROTHROMBIN TIME: CPT | Performed by: EMERGENCY MEDICINE

## 2021-08-09 PROCEDURE — 85025 COMPLETE CBC W/AUTO DIFF WBC: CPT | Performed by: EMERGENCY MEDICINE

## 2021-08-09 RX ORDER — SODIUM CHLORIDE 0.9 % (FLUSH) 0.9 %
10 SYRINGE (ML) INJECTION AS NEEDED
Status: DISCONTINUED | OUTPATIENT
Start: 2021-08-09 | End: 2021-08-10 | Stop reason: HOSPADM

## 2021-08-09 RX ADMIN — IOPAMIDOL 95 ML: 755 INJECTION, SOLUTION INTRAVENOUS at 23:10

## 2021-08-09 NOTE — ED NOTES
Patient states she started coughing up bright red blood a couple of hours ago. Patient states she did cough up some clots as well. Denies any blood thinners. States she coughs up approximately 10ml at a time every hour.      Hayde Dominique RN  08/09/21 0834

## 2021-08-09 NOTE — TELEPHONE ENCOUNTER
Pt called at ~6:15 pm on-call on 08/09/21  Pt notes was out for a walk and noted some phlegm.  Coughed it up and it was 'bright red blood'. Has continued to cough up red blood and come clots for last 30 minutes.  I advised pt to ED immediately for eval and imaging noting risk of more brisk bleeding/ high risk bleeding location. Pt voiced understanding.

## 2021-08-10 ENCOUNTER — TELEPHONE (OUTPATIENT)
Dept: INTERNAL MEDICINE | Facility: CLINIC | Age: 76
End: 2021-08-10

## 2021-08-10 VITALS
DIASTOLIC BLOOD PRESSURE: 84 MMHG | HEART RATE: 68 BPM | RESPIRATION RATE: 18 BRPM | TEMPERATURE: 98.7 F | OXYGEN SATURATION: 97 % | SYSTOLIC BLOOD PRESSURE: 161 MMHG

## 2021-08-10 RX ORDER — DOXYCYCLINE 100 MG/1
100 CAPSULE ORAL 2 TIMES DAILY
Qty: 20 CAPSULE | Refills: 0 | Status: SHIPPED | OUTPATIENT
Start: 2021-08-10 | End: 2021-08-20

## 2021-08-10 RX ORDER — BENZONATATE 200 MG/1
200 CAPSULE ORAL 3 TIMES DAILY PRN
Qty: 30 CAPSULE | Refills: 0 | Status: SHIPPED | OUTPATIENT
Start: 2021-08-10 | End: 2021-12-02

## 2021-08-10 NOTE — ED PROVIDER NOTES
EMERGENCY DEPARTMENT ENCOUNTER    Room Number:  14/14  Date of encounter:  8/10/2021  PCP: Virgilio Rivas MD  Historian: Patient      HPI:  Chief Complaint: Coughing up blood  A complete HPI/ROS/PMH/PSH/SH/FH are unobtainable due to: None    Context: Lani Lopez is a 76 y.o. female who presents to the ED c/o several episodes hemoptysis today.  States she is not been feeling short of breath or unwell.  States when she coughs productive of bright red blood.  Denies any nausea vomiting.  No chest pain no abdominal pain.  States that since 6 PM she has had 2 episodes of small amount of hemoptysis.      PAST MEDICAL HISTORY  Active Ambulatory Problems     Diagnosis Date Noted   • Osteopenia of hip 10/03/2019   • Asymptomatic varicose veins of left lower extremity 10/03/2019   • Age-related osteoporosis without current pathological fracture 09/22/2020     Resolved Ambulatory Problems     Diagnosis Date Noted   • No Resolved Ambulatory Problems     Past Medical History:   Diagnosis Date   • Osteopenia of multiple sites 10/3/2019   • Ovarian cyst 1997   • Psoriasis    • Shingles          PAST SURGICAL HISTORY  Past Surgical History:   Procedure Laterality Date   • BREAST BIOPSY Left 2000    benign   • CATARACT EXTRACTION, BILATERAL  2015   • TOTAL ABDOMINAL HYSTERECTOMY WITH SALPINGO OOPHORECTOMY  1997         FAMILY HISTORY  Family History   Problem Relation Age of Onset   • Leukemia Mother    • Hypothyroidism Mother    • Hypertension Mother    • Osteoporosis Mother    • Emphysema Father         smoker   • Prostate cancer Father    • Breast cancer Sister         bilateral at age 55; genetic testing negative BRCA   • Colon cancer Maternal Grandfather         age 86   • Leukemia Niece    • No Known Problems Daughter    • No Known Problems Daughter    • Melanoma Daughter          SOCIAL HISTORY  Social History     Socioeconomic History   • Marital status:      Spouse name: Not on file   • Number of children: 3    • Years of education: Not on file   • Highest education level: Not on file   Tobacco Use   • Smoking status: Former Smoker     Quit date:      Years since quittin.6   • Smokeless tobacco: Never Used   • Tobacco comment: former light smoker.  quit over 20 years ago   Vaping Use   • Vaping Use: Never used   Substance and Sexual Activity   • Alcohol use: Yes     Comment: ~1 drink/ month   • Drug use: No   • Sexual activity: Yes     Partners: Male     Comment: no hx of STD's         ALLERGIES  Patient has no known allergies.        REVIEW OF SYSTEMS  Review of Systems     All systems reviewed and negative except for those discussed in HPI.       PHYSICAL EXAM    I have reviewed the triage vital signs and nursing notes.    ED Triage Vitals   Temp Heart Rate Resp BP SpO2   21 1901 21 1901 21 19021 1903 08/09/21 190   98.7 °F (37.1 °C) 92 16 146/88 98 %      Temp src Heart Rate Source Patient Position BP Location FiO2 (%)   21 19021 2228 21 --   Tympanic Monitor Sitting Right arm        Physical Exam  GENERAL: not distressed  HENT: nares patent  EYES: no scleral icterus  CV: regular rhythm, regular rate  RESPIRATORY: normal effort, clear to auscultation bilaterally  ABDOMEN: soft, nontender, nondistended  MUSCULOSKELETAL: no deformity  NEURO: alert, moves all extremities, follows commands  SKIN: warm, dry        LAB RESULTS  Recent Results (from the past 24 hour(s))   Comprehensive Metabolic Panel    Collection Time: 21 10:23 PM    Specimen: Blood   Result Value Ref Range    Glucose 96 65 - 99 mg/dL    BUN 12 8 - 23 mg/dL    Creatinine 0.70 0.57 - 1.00 mg/dL    Sodium 139 136 - 145 mmol/L    Potassium 3.8 3.5 - 5.2 mmol/L    Chloride 101 98 - 107 mmol/L    CO2 26.9 22.0 - 29.0 mmol/L    Calcium 9.6 8.6 - 10.5 mg/dL    Total Protein 8.0 6.0 - 8.5 g/dL    Albumin 4.60 3.50 - 5.20 g/dL    ALT (SGPT) 17 1 - 33 U/L    AST (SGOT) 25 1 - 32 U/L     Alkaline Phosphatase 78 39 - 117 U/L    Total Bilirubin 0.2 0.0 - 1.2 mg/dL    eGFR Non African Amer 82 >60 mL/min/1.73    Globulin 3.4 gm/dL    A/G Ratio 1.4 g/dL    BUN/Creatinine Ratio 17.1 7.0 - 25.0    Anion Gap 11.1 5.0 - 15.0 mmol/L   Protime-INR    Collection Time: 08/09/21 10:23 PM    Specimen: Blood   Result Value Ref Range    Protime 12.9 11.7 - 14.2 Seconds    INR 0.99 0.90 - 1.10   aPTT    Collection Time: 08/09/21 10:23 PM    Specimen: Blood   Result Value Ref Range    PTT 27.8 22.7 - 35.4 seconds   CBC Auto Differential    Collection Time: 08/09/21 10:23 PM    Specimen: Blood   Result Value Ref Range    WBC 11.41 (H) 3.40 - 10.80 10*3/mm3    RBC 4.38 3.77 - 5.28 10*6/mm3    Hemoglobin 13.8 12.0 - 15.9 g/dL    Hematocrit 40.1 34.0 - 46.6 %    MCV 91.6 79.0 - 97.0 fL    MCH 31.5 26.6 - 33.0 pg    MCHC 34.4 31.5 - 35.7 g/dL    RDW 12.0 (L) 12.3 - 15.4 %    RDW-SD 39.6 37.0 - 54.0 fl    MPV 11.2 6.0 - 12.0 fL    Platelets 291 140 - 450 10*3/mm3    Neutrophil % 74.1 42.7 - 76.0 %    Lymphocyte % 17.5 (L) 19.6 - 45.3 %    Monocyte % 6.7 5.0 - 12.0 %    Eosinophil % 0.9 0.3 - 6.2 %    Basophil % 0.5 0.0 - 1.5 %    Immature Grans % 0.3 0.0 - 0.5 %    Neutrophils, Absolute 8.45 (H) 1.70 - 7.00 10*3/mm3    Lymphocytes, Absolute 2.00 0.70 - 3.10 10*3/mm3    Monocytes, Absolute 0.77 0.10 - 0.90 10*3/mm3    Eosinophils, Absolute 0.10 0.00 - 0.40 10*3/mm3    Basophils, Absolute 0.06 0.00 - 0.20 10*3/mm3    Immature Grans, Absolute 0.03 0.00 - 0.05 10*3/mm3    nRBC 0.0 0.0 - 0.2 /100 WBC       Ordered the above labs and independently reviewed the results.        RADIOLOGY  CT Angiogram Chest    Result Date: 8/9/2021  CT ANGIOGRAM OF THE CHEST  HISTORY: Hemoptysis  COMPARISON: None available.  TECHNIQUE: Axial CT imaging was obtained through the thorax. IV contrast was administered. Three-D reformatted images were obtained.  FINDINGS: No acute pulmonary thromboembolus is seen. Thoracic aorta is normal in caliber.  There is no evidence of dissection. There are some coronary artery calcifications. There is biapical scarring. The patient is noted to have its are noted within the right middle lobe and lingula bilaterally, with associated mild bronchiectasis, particularly on the right. There are also some tree-in-bud infiltrates, likely infectious or inflammatory in etiology. No suspicious pulmonary nodules or masses are seen. There is a nodule within the left lobe of the thyroid gland, measuring approximately 1.1 cm. This would be better assessed with dedicated thyroid ultrasound. There may be a second more posterior left thyroid nodule, measuring approximately 1 cm. Trachea and esophagus appear within normal limits. Mediastinal lymph nodes do not appear pathologically enlarged. Images through the upper abdomen do not demonstrate any acute abnormalities. No acute osseous abnormalities are seen.       1. No acute pulmonary thromboembolus seen. 2. Areas of chronic scarring identified within the right middle lobe and lingula, with associated mild bronchiectasis. There are some associated tree-in-bud infiltrates within both of these areas, likely infectious or inflammatory etiology. Atypical mycobacterial infection in particular could have this appearance.  Radiation dose reduction techniques were utilized, including automated exposure control and exposure modulation based on body size.  This report was finalized on 8/9/2021 11:27 PM by Dr. Nani Jasso M.D.        I ordered the above noted radiological studies. Reviewed by me and discussed with radiologist.  See dictation for official radiology interpretation.      PROCEDURES    Procedures      MEDICATIONS GIVEN IN ER    Medications   sodium chloride 0.9 % flush 10 mL (has no administration in time range)   iopamidol (ISOVUE-370) 76 % injection 100 mL (95 mL Intravenous Given by Other 8/9/21 0151)         PROGRESS, DATA ANALYSIS, CONSULTS, AND MEDICAL DECISION MAKING    All  labs have been independently reviewed by me.  All radiology studies have been reviewed by me and discussed with radiologist dictating the report.   EKG's independently viewed and interpreted by me.  Discussion below represents my analysis of pertinent findings related to patient's condition, differential diagnosis, treatment plan and final disposition.                 PPE: Both the patient and I wore a surgical mask throughout the entire patient encounter. I wore protective goggles.     AS OF 00:22 EDT VITALS:    BP - 177/86  HR - 70  TEMP - 98.7 °F (37.1 °C) (Tympanic)  O2 SATS - 98%        DIAGNOSIS  Final diagnoses:   Atypical pneumonia         DISPOSITION  Discharge           Tristan Biggs MD  08/10/21 0023

## 2021-08-10 NOTE — TELEPHONE ENCOUNTER
Patient called and stated that she got discharged from the hospital yesterday and they told her she needs to make an appointment tomorrow to see her pcp. She sates that she is diagnosed with walking pneumonia.   Do you want to work her in tomorrow or do you want her to see Zoe John?

## 2021-08-10 NOTE — TELEPHONE ENCOUNTER
Since this is suspected bacterial, I do think she can visit grandkids. However, would advise mask and hand hygiene during visit.  She does not have to observe a specific quarantine for mycobacterial pneumonia.

## 2021-08-10 NOTE — ED NOTES
.RN wearing all appropriate PPE during entire encounter with patient.        Behringer, Catherine, RN  08/09/21 4803

## 2021-08-11 ENCOUNTER — TELEPHONE (OUTPATIENT)
Dept: INTERNAL MEDICINE | Facility: CLINIC | Age: 76
End: 2021-08-11

## 2021-08-11 NOTE — TELEPHONE ENCOUNTER
Talked with patient and let her know about message with visit to grandchildren.  She advised me that she had spit up some blood with mucus this morning about 10am.  I advised that I would send you a message.  She said that she feel fine.  I explained that if she should start to feel bad any SOA or coughing up blood to return to the ER

## 2021-08-11 NOTE — TELEPHONE ENCOUNTER
Patient would like to speak with a medical assistant regarding spiting up some blood today. She went to the ER on 8/9/21 and has a follow up appointment with Dr. Rivas on 08/16/2021 for that ER visit.     Best call back # [phone]

## 2021-08-12 NOTE — TELEPHONE ENCOUNTER
Noted.  Please call pt and f/u on condition in AM (Friday).  I think reasonable to monitor at home on Abx as long as NO LARGE VOLUME blood production (to ED in that case).  Will see in office upcoming.

## 2021-08-13 ENCOUNTER — TELEPHONE (OUTPATIENT)
Dept: INTERNAL MEDICINE | Facility: CLINIC | Age: 76
End: 2021-08-13

## 2021-08-13 NOTE — TELEPHONE ENCOUNTER
I talked to patient this am.  She is aware of message.  She only had a small tinge of blood in mucus.  Monitor symptoms.  If Large blood to er .  Will follow up on next visit unless symptoms worsen

## 2021-08-16 ENCOUNTER — OFFICE VISIT (OUTPATIENT)
Dept: INTERNAL MEDICINE | Facility: CLINIC | Age: 76
End: 2021-08-16

## 2021-08-16 VITALS
HEIGHT: 65 IN | SYSTOLIC BLOOD PRESSURE: 128 MMHG | DIASTOLIC BLOOD PRESSURE: 70 MMHG | TEMPERATURE: 97.3 F | HEART RATE: 75 BPM | WEIGHT: 109 LBS | OXYGEN SATURATION: 99 % | BODY MASS INDEX: 18.16 KG/M2 | RESPIRATION RATE: 16 BRPM

## 2021-08-16 DIAGNOSIS — J18.9 ATYPICAL PNEUMONIA: ICD-10-CM

## 2021-08-16 DIAGNOSIS — D72.825 BANDEMIA: ICD-10-CM

## 2021-08-16 DIAGNOSIS — E04.1 THYROID NODULE: Primary | ICD-10-CM

## 2021-08-16 PROCEDURE — 99214 OFFICE O/P EST MOD 30 MIN: CPT | Performed by: INTERNAL MEDICINE

## 2021-08-16 NOTE — PROGRESS NOTES
"Pneumonia (follow up)      HPI  Lani Lopez is a 76 y.o. female RTC in short interval f/u after recent on call call to MD and ED aly.  Pt dx'd with suspected atypical pneumonia.  In hindsight, pt feels like may have had mild cough issue for last year. Had mild mucous with that.  Did not have blood in mucous until last week.  Pt notes \"it was copious amount\". After day #1 of antibiotics 'I have not coughed once'.  No fever.  Feels well otherwise. No SOA at all.     CT scan noted a thyroid nodule. Pt asks about this. 'Is that large?'. No sx noted.     Review of Systems   Constitutional: Negative for chills, fever and malaise/fatigue.   HENT: Negative for odynophagia.    Cardiovascular: Negative for dyspnea on exertion.   Respiratory: Negative for cough (resolved), hemoptysis (resolved), shortness of breath and sputum production (resolved).    Musculoskeletal: Negative for neck pain.       The following portions of the patient's history were reviewed and updated as appropriate: allergies, current medications, past medical history, past social history and problem list.      Current Outpatient Medications:   •  calcium carbonate (OS-MARC) 600 MG tablet, Take 600 mg by mouth Daily., Disp: , Rfl:   •  doxycycline (MONODOX) 100 MG capsule, Take 1 capsule by mouth 2 (Two) Times a Day for 10 days., Disp: 20 capsule, Rfl: 0  •  Multiple Vitamins-Minerals (MULTIVITAMIN ADULT PO), Take  by mouth., Disp: , Rfl:   •  Omega-3 Fatty Acids (FISH OIL) 1000 MG capsule capsule, Take  by mouth Daily With Breakfast., Disp: , Rfl:   •  benzonatate (TESSALON) 200 MG capsule, Take 1 capsule by mouth 3 (Three) Times a Day As Needed for Cough., Disp: 30 capsule, Rfl: 0  •  HYDROcodone-acetaminophen (NORCO) 7.5-325 MG per tablet, Take 1 tablet by mouth Every 6 (Six) Hours As Needed for Moderate Pain ., Disp: 15 tablet, Rfl: 0    Vitals:    08/16/21 0847   BP: 128/70   BP Location: Left arm   Patient Position: Sitting   Cuff Size: Adult " "  Pulse: 75   Resp: 16   Temp: 97.3 °F (36.3 °C)   SpO2: 99%   Weight: 49.4 kg (109 lb)   Height: 165.1 cm (65\")     Body mass index is 18.14 kg/m².      Physical Exam  Constitutional:       General: She is not in acute distress.     Appearance: Normal appearance. She is normal weight. She is not ill-appearing or toxic-appearing.   HENT:      Head: Normocephalic and atraumatic.   Eyes:      General: No scleral icterus.     Conjunctiva/sclera: Conjunctivae normal.      Pupils: Pupils are equal, round, and reactive to light.   Neck:      Thyroid: No thyroid mass, thyromegaly or thyroid tenderness.      Vascular: No carotid bruit.   Cardiovascular:      Rate and Rhythm: Normal rate and regular rhythm.      Heart sounds: No murmur heard.   No friction rub. No gallop.    Pulmonary:      Effort: Pulmonary effort is normal. No respiratory distress.      Breath sounds: No wheezing, rhonchi or rales.   Musculoskeletal:      Cervical back: Normal range of motion and neck supple. No tenderness.      Right lower leg: No edema.      Left lower leg: No edema.   Lymphadenopathy:      Cervical: No cervical adenopathy.      Upper Body:      Right upper body: No axillary adenopathy.      Left upper body: No axillary adenopathy.   Neurological:      General: No focal deficit present.      Mental Status: She is alert.      Cranial Nerves: No cranial nerve deficit.      Gait: Gait normal.   Psychiatric:         Mood and Affect: Mood normal.         Behavior: Behavior normal.         Thought Content: Thought content normal.           Assessment/ Plan  Diagnoses and all orders for this visit:    Thyroid nodule  -     US Thyroid; Future  -     TSH    Bandemia  -     CBC & Differential    Atypical pneumonia  -     CBC & Differential  -     CT Chest Without Contrast; Future        Return for Next scheduled follow up.      Discussion:  Lani Lopez is a 76 y.o. female RTC in short interval f/u after ED eval at my direction on 8/9/21 after " call to office noting BRB in mucous with cough.  Pt dx'd with RML and lingular aytpical pneumonia on CT angio chest. Pt is on doxycycline and tolerating well, noting year long cough that had dismissed has now resolved fully. Feels well overall.  Leukocytosis noted on CBC and will trend today.  Will get CT chest repeat in 5 weeks to document clearance of infection, reviewed rationale with pt.   Thyroid nodule, 1.1cm, L lobe - incidental note on CT chest.  Not palpable on exam today. Reviewed with pt, will get U/S to eval.  Check TSH today.     RTC as planned.

## 2021-08-17 LAB
BASOPHILS # BLD AUTO: 0.07 10*3/MM3 (ref 0–0.2)
BASOPHILS NFR BLD AUTO: 1.2 % (ref 0–1.5)
EOSINOPHIL # BLD AUTO: 0.1 10*3/MM3 (ref 0–0.4)
EOSINOPHIL NFR BLD AUTO: 1.7 % (ref 0.3–6.2)
ERYTHROCYTE [DISTWIDTH] IN BLOOD BY AUTOMATED COUNT: 11.9 % (ref 12.3–15.4)
HCT VFR BLD AUTO: 40.4 % (ref 34–46.6)
HGB BLD-MCNC: 13.8 G/DL (ref 12–15.9)
IMM GRANULOCYTES # BLD AUTO: 0.02 10*3/MM3 (ref 0–0.05)
IMM GRANULOCYTES NFR BLD AUTO: 0.3 % (ref 0–0.5)
LYMPHOCYTES # BLD AUTO: 1.37 10*3/MM3 (ref 0.7–3.1)
LYMPHOCYTES NFR BLD AUTO: 23.1 % (ref 19.6–45.3)
MCH RBC QN AUTO: 31.2 PG (ref 26.6–33)
MCHC RBC AUTO-ENTMCNC: 34.2 G/DL (ref 31.5–35.7)
MCV RBC AUTO: 91.2 FL (ref 79–97)
MONOCYTES # BLD AUTO: 0.56 10*3/MM3 (ref 0.1–0.9)
MONOCYTES NFR BLD AUTO: 9.4 % (ref 5–12)
NEUTROPHILS # BLD AUTO: 3.81 10*3/MM3 (ref 1.7–7)
NEUTROPHILS NFR BLD AUTO: 64.3 % (ref 42.7–76)
NRBC BLD AUTO-RTO: 0.2 /100 WBC (ref 0–0.2)
PLATELET # BLD AUTO: 282 10*3/MM3 (ref 140–450)
RBC # BLD AUTO: 4.43 10*6/MM3 (ref 3.77–5.28)
TSH SERPL DL<=0.005 MIU/L-ACNC: 1.83 UIU/ML (ref 0.27–4.2)
WBC # BLD AUTO: 5.93 10*3/MM3 (ref 3.4–10.8)

## 2021-09-15 ENCOUNTER — HOSPITAL ENCOUNTER (OUTPATIENT)
Dept: ULTRASOUND IMAGING | Facility: HOSPITAL | Age: 76
Discharge: HOME | End: 2021-09-15
Admitting: INTERNAL MEDICINE

## 2021-09-15 ENCOUNTER — HOSPITAL ENCOUNTER (OUTPATIENT)
Dept: CT IMAGING | Facility: HOSPITAL | Age: 76
Discharge: HOME | End: 2021-09-15
Admitting: INTERNAL MEDICINE

## 2021-09-15 DIAGNOSIS — J18.9 ATYPICAL PNEUMONIA: ICD-10-CM

## 2021-09-15 DIAGNOSIS — E04.1 THYROID NODULE: ICD-10-CM

## 2021-09-15 PROCEDURE — 76536 US EXAM OF HEAD AND NECK: CPT

## 2021-09-15 PROCEDURE — 71250 CT THORAX DX C-: CPT

## 2021-09-16 PROBLEM — J18.9 PNEUMONIA OF RIGHT MIDDLE LOBE DUE TO INFECTIOUS ORGANISM: Status: ACTIVE | Noted: 2021-09-16

## 2021-09-17 DIAGNOSIS — E04.2 MULTIPLE THYROID NODULES: Primary | ICD-10-CM

## 2021-09-17 DIAGNOSIS — E04.1 THYROID NODULE GREATER THAN OR EQUAL TO 1.5 CM IN DIAMETER INCIDENTALLY NOTED ON IMAGING STUDY: ICD-10-CM

## 2021-09-22 ENCOUNTER — TRANSCRIBE ORDERS (OUTPATIENT)
Dept: ADMINISTRATIVE | Facility: HOSPITAL | Age: 76
End: 2021-09-22

## 2021-09-22 DIAGNOSIS — E04.1 THYROID NODULE: Primary | ICD-10-CM

## 2021-10-07 ENCOUNTER — HOSPITAL ENCOUNTER (OUTPATIENT)
Dept: ULTRASOUND IMAGING | Facility: HOSPITAL | Age: 76
Discharge: HOME | End: 2021-10-07

## 2021-10-07 DIAGNOSIS — E04.1 THYROID NODULE: ICD-10-CM

## 2021-10-07 PROCEDURE — 88305 TISSUE EXAM BY PATHOLOGIST: CPT | Performed by: OTOLARYNGOLOGY

## 2021-10-07 PROCEDURE — 88173 CYTOPATH EVAL FNA REPORT: CPT | Performed by: OTOLARYNGOLOGY

## 2021-10-07 PROCEDURE — 25010000003 LIDOCAINE 1 % SOLUTION: Performed by: OTOLARYNGOLOGY

## 2021-10-07 RX ORDER — LIDOCAINE HYDROCHLORIDE 10 MG/ML
5 INJECTION, SOLUTION INFILTRATION; PERINEURAL ONCE
Status: COMPLETED | OUTPATIENT
Start: 2021-10-07 | End: 2021-10-07

## 2021-10-07 RX ADMIN — LIDOCAINE HYDROCHLORIDE 5 ML: 10 INJECTION, SOLUTION INFILTRATION; PERINEURAL at 10:50

## 2021-10-07 RX ADMIN — SODIUM BICARBONATE 0.5 MEQ: 0.2 INJECTION, SOLUTION INTRAVENOUS at 10:50

## 2021-10-08 LAB
CYTO UR: NORMAL
LAB AP CASE REPORT: NORMAL
LAB AP NON-GYN SPECIMEN ADEQUACY: NORMAL
PATH REPORT.FINAL DX SPEC: NORMAL
PATH REPORT.GROSS SPEC: NORMAL

## 2021-10-20 ENCOUNTER — APPOINTMENT (OUTPATIENT)
Dept: WOMENS IMAGING | Facility: HOSPITAL | Age: 76
End: 2021-10-20

## 2021-10-20 PROCEDURE — 77063 BREAST TOMOSYNTHESIS BI: CPT | Performed by: RADIOLOGY

## 2021-10-20 PROCEDURE — 77067 SCR MAMMO BI INCL CAD: CPT | Performed by: RADIOLOGY

## 2021-11-16 DIAGNOSIS — Z00.00 HEALTHCARE MAINTENANCE: Primary | ICD-10-CM

## 2021-11-16 DIAGNOSIS — M85.859 OSTEOPENIA OF HIP, UNSPECIFIED LATERALITY: ICD-10-CM

## 2021-11-16 DIAGNOSIS — E04.1 THYROID NODULE: ICD-10-CM

## 2021-11-16 DIAGNOSIS — E78.89 LIPIDS ABNORMAL: ICD-10-CM

## 2021-11-25 LAB
ALBUMIN SERPL-MCNC: 4.4 G/DL (ref 3.7–4.7)
ALBUMIN/GLOB SERPL: 1.5 {RATIO} (ref 1.2–2.2)
ALP SERPL-CCNC: 87 IU/L (ref 44–121)
ALT SERPL-CCNC: 18 IU/L (ref 0–32)
APPEARANCE UR: CLEAR
AST SERPL-CCNC: 30 IU/L (ref 0–40)
BACTERIA #/AREA URNS HPF: NORMAL /[HPF]
BASOPHILS # BLD AUTO: 0.1 X10E3/UL (ref 0–0.2)
BASOPHILS NFR BLD AUTO: 1 %
BILIRUB SERPL-MCNC: 0.5 MG/DL (ref 0–1.2)
BILIRUB UR QL STRIP: NEGATIVE
BUN SERPL-MCNC: 11 MG/DL (ref 8–27)
BUN/CREAT SERPL: 14 (ref 12–28)
CALCIUM SERPL-MCNC: 9.8 MG/DL (ref 8.7–10.3)
CASTS URNS QL MICRO: NORMAL /LPF
CHLORIDE SERPL-SCNC: 101 MMOL/L (ref 96–106)
CHOLEST SERPL-MCNC: 217 MG/DL (ref 100–199)
CO2 SERPL-SCNC: 24 MMOL/L (ref 20–29)
COLOR UR: YELLOW
CREAT SERPL-MCNC: 0.81 MG/DL (ref 0.57–1)
EOSINOPHIL # BLD AUTO: 0.2 X10E3/UL (ref 0–0.4)
EOSINOPHIL NFR BLD AUTO: 3 %
EPI CELLS #/AREA URNS HPF: NORMAL /HPF (ref 0–10)
ERYTHROCYTE [DISTWIDTH] IN BLOOD BY AUTOMATED COUNT: 11.8 % (ref 11.7–15.4)
GLOBULIN SER CALC-MCNC: 2.9 G/DL (ref 1.5–4.5)
GLUCOSE SERPL-MCNC: 95 MG/DL (ref 65–99)
GLUCOSE UR QL: NEGATIVE
HCT VFR BLD AUTO: 40.6 % (ref 34–46.6)
HDLC SERPL-MCNC: 76 MG/DL
HGB BLD-MCNC: 13.9 G/DL (ref 11.1–15.9)
HGB UR QL STRIP: NEGATIVE
IMM GRANULOCYTES # BLD AUTO: 0 X10E3/UL (ref 0–0.1)
IMM GRANULOCYTES NFR BLD AUTO: 0 %
KETONES UR QL STRIP: NEGATIVE
LDLC SERPL CALC-MCNC: 128 MG/DL (ref 0–99)
LEUKOCYTE ESTERASE UR QL STRIP: NEGATIVE
LYMPHOCYTES # BLD AUTO: 1.5 X10E3/UL (ref 0.7–3.1)
LYMPHOCYTES NFR BLD AUTO: 23 %
MCH RBC QN AUTO: 32 PG (ref 26.6–33)
MCHC RBC AUTO-ENTMCNC: 34.2 G/DL (ref 31.5–35.7)
MCV RBC AUTO: 93 FL (ref 79–97)
MICRO URNS: NORMAL
MICRO URNS: NORMAL
MONOCYTES # BLD AUTO: 0.6 X10E3/UL (ref 0.1–0.9)
MONOCYTES NFR BLD AUTO: 9 %
NEUTROPHILS # BLD AUTO: 4.2 X10E3/UL (ref 1.4–7)
NEUTROPHILS NFR BLD AUTO: 64 %
NITRITE UR QL STRIP: NEGATIVE
PH UR STRIP: 7 [PH] (ref 5–7.5)
PLATELET # BLD AUTO: 282 X10E3/UL (ref 150–450)
POTASSIUM SERPL-SCNC: 4.8 MMOL/L (ref 3.5–5.2)
PROT SERPL-MCNC: 7.3 G/DL (ref 6–8.5)
PROT UR QL STRIP: NEGATIVE
RBC # BLD AUTO: 4.35 X10E6/UL (ref 3.77–5.28)
RBC #/AREA URNS HPF: NORMAL /HPF (ref 0–2)
SODIUM SERPL-SCNC: 141 MMOL/L (ref 134–144)
SP GR UR: 1 (ref 1–1.03)
TRIGL SERPL-MCNC: 72 MG/DL (ref 0–149)
TSH SERPL DL<=0.005 MIU/L-ACNC: 3 UIU/ML (ref 0.45–4.5)
URINALYSIS REFLEX: NORMAL
UROBILINOGEN UR STRIP-MCNC: 0.2 MG/DL (ref 0.2–1)
VLDLC SERPL CALC-MCNC: 13 MG/DL (ref 5–40)
WBC # BLD AUTO: 6.5 X10E3/UL (ref 3.4–10.8)
WBC #/AREA URNS HPF: NORMAL /HPF (ref 0–5)

## 2021-12-02 ENCOUNTER — OFFICE VISIT (OUTPATIENT)
Dept: INTERNAL MEDICINE | Facility: CLINIC | Age: 76
End: 2021-12-02

## 2021-12-02 VITALS
RESPIRATION RATE: 12 BRPM | TEMPERATURE: 96.9 F | HEIGHT: 65 IN | WEIGHT: 110 LBS | DIASTOLIC BLOOD PRESSURE: 70 MMHG | BODY MASS INDEX: 18.33 KG/M2 | OXYGEN SATURATION: 98 % | HEART RATE: 83 BPM | SYSTOLIC BLOOD PRESSURE: 122 MMHG

## 2021-12-02 DIAGNOSIS — M81.0 AGE-RELATED OSTEOPOROSIS WITHOUT CURRENT PATHOLOGICAL FRACTURE: ICD-10-CM

## 2021-12-02 DIAGNOSIS — E04.2 MULTIPLE THYROID NODULES: ICD-10-CM

## 2021-12-02 DIAGNOSIS — M67.442 GANGLION CYST OF FLEXOR TENDON SHEATH OF FINGER OF LEFT HAND: ICD-10-CM

## 2021-12-02 DIAGNOSIS — I83.92 ASYMPTOMATIC VARICOSE VEINS OF LEFT LOWER EXTREMITY: ICD-10-CM

## 2021-12-02 DIAGNOSIS — F41.8 ANXIETY ABOUT HEALTH: ICD-10-CM

## 2021-12-02 DIAGNOSIS — J18.9 PNEUMONIA OF RIGHT MIDDLE LOBE DUE TO INFECTIOUS ORGANISM: ICD-10-CM

## 2021-12-02 DIAGNOSIS — Z00.00 HEALTHCARE MAINTENANCE: Primary | ICD-10-CM

## 2021-12-02 DIAGNOSIS — R93.89 ABNORMAL CT OF THE CHEST: ICD-10-CM

## 2021-12-02 DIAGNOSIS — M85.859 OSTEOPENIA OF HIP, UNSPECIFIED LATERALITY: ICD-10-CM

## 2021-12-02 PROBLEM — R45.89 ANXIETY ABOUT HEALTH: Status: ACTIVE | Noted: 2021-12-02

## 2021-12-02 PROCEDURE — G0439 PPPS, SUBSEQ VISIT: HCPCS | Performed by: INTERNAL MEDICINE

## 2021-12-02 PROCEDURE — 1170F FXNL STATUS ASSESSED: CPT | Performed by: INTERNAL MEDICINE

## 2021-12-02 PROCEDURE — 1125F AMNT PAIN NOTED PAIN PRSNT: CPT | Performed by: INTERNAL MEDICINE

## 2021-12-02 PROCEDURE — 1159F MED LIST DOCD IN RCRD: CPT | Performed by: INTERNAL MEDICINE

## 2021-12-02 PROCEDURE — 99397 PER PM REEVAL EST PAT 65+ YR: CPT | Performed by: INTERNAL MEDICINE

## 2021-12-02 NOTE — PATIENT INSTRUCTIONS
Medicare Wellness  Personal Prevention Plan of Service     Date of Office Visit:  2021  Encounter Provider:  Virgilio Rivas MD  Place of Service:  [facility]  Patient Name: Lani Lopez  :  1945    As part of the Medicare Wellness portion of your visit today, we are providing you with this personalized preventive plan of services (PPPS). This plan is based upon recommendations of the [place] Preventive Services Task Force (USPSTF) and the Advisory Committee on Immunization Practices (ACIP).    This lists the preventive care services that should be considered, and provides dates of when you are due. Items listed as completed are up-to-date and do not require any further intervention.    Health Maintenance   Topic Date Due   • COLORECTAL CANCER SCREENING  2020   • DXA SCAN  10/10/2021   • MAMMOGRAM  10/20/2022   • ANNUAL WELLNESS VISIT  2022   • TDAP/TD VACCINES (2 - Td or Tdap) 2027   • HEPATITIS C SCREENING  Completed   • COVID-19 Vaccine  Completed   • INFLUENZA VACCINE  Completed   • Pneumococcal Vaccine 65+  Completed   • ZOSTER VACCINE  Completed       Orders Placed This Encounter   Procedures   • CT Chest Without Contrast     Standing Status:   Future     Standing Expiration Date:   2022     Scheduling Instructions:      After mya Laurent request     Order Specific Question:   Release to patient     Answer:   Immediate   • DEXA Bone Density Axial     Standing Status:   Future     Standing Expiration Date:   2022     Order Specific Question:   Is patient taking or have taken long term Glucocorticoid (steroids)?     Answer:   No     Order Specific Question:   Does the patient have rheumatoid arthritis?     Answer:   No     Order Specific Question:   Does the patient have secondary osteoporosis?     Answer:   No     Order Specific Question:   Reason for Exam:     Answer:   Osteoporosis     Order Specific Question:   Does this  patient have a diabetic monitoring/medication delivering device on?     Answer:   No     Order Specific Question:   Release to patient     Answer:   Immediate       Return in about 1 year (around 12/2/2022) for Medicare Wellness, Annual physical.

## 2021-12-02 NOTE — PROGRESS NOTES
"Subjective       Chief Complaint   Patient presents with   • Annual Exam     review of medical issues    • cyst on left hand   • Foot Pain     right        HPI:  Lani Lopez is a 76 y.o. female RTC in yearly CPE/AWV, review of medical issues: Notes has been doing well. Has been reading on [employer] and notes 'it is scary for me'. Has been really anxious and admits. Will have variable chest pains from right to left, no clear trigger. Will be fleeting and 'very brief'.  Admits is very anxious. No cough at all, 'not once' since stopped the Abx.  Pt visibly gets anxious when talking about this office today.      1. Thyroid nodule, 1.1cm, L lobe - incidental note on CT chest.  Had U/S 10/2021.  Path was benign.  Told will need U/S in 6 months. ENT to f/u with pt.   2. Varicose Vein, L leg - asx'ic, no bleeding, no pain. \"I never have a pain from those at all'.  No swelling.   Monitor.   3. Osteopenia hip and osteoporosis in spine, low FRAX on 10/2019 DEXA, excellent exercise routine; hx of Fosamax x 3 years ending in 2000 - is still doing weight bearing exercise.  Getting 10K steps 'a day or I die, I just have to do that'. Is playing tennis. Does yard work.   4. Hx of psoriasis - noted in distant past, no recurrence. Has seen derm this year, 'clean bill of health'.   5. HM - declines Hep A at pharmacy,   Mammo (-) 10/2021, no breast exam done.     The following portions of the patient's history were reviewed and updated as appropriate: allergies, current medications, past family history, past medical history, past social history, past surgical history and problem list.    Review of Systems   Constitutional: Negative for chills, fever, malaise/fatigue, weight gain and weight loss.   HENT: Negative for congestion, hearing loss, odynophagia and sore throat.    Eyes: Negative for discharge, double vision, pain and redness.        Sees optho upcoming.  Wears readers.      Cardiovascular: Negative for chest pain, dyspnea on " exertion, irregular heartbeat, leg swelling, near-syncope, palpitations and syncope.   Respiratory: Negative for cough, shortness of breath and sputum production.    Endocrine: Negative for polydipsia, polyphagia and polyuria.   Hematologic/Lymphatic: Negative for bleeding problem. Does not bruise/bleed easily.   Skin: Negative for rash and suspicious lesions.   Musculoskeletal: Negative for falls, joint swelling, muscle cramps, muscle weakness and myalgias.        Has area on L palm that is thickened and little 'kernal' over tendon. No pain. Not sure what it is.  Asks for eval.      Gastrointestinal: Negative for constipation, diarrhea, dysphagia, heartburn, nausea and vomiting.   Genitourinary: Negative for dysuria, frequency, hematuria, hesitancy and incomplete emptying.   Neurological: Negative for dizziness, headaches and light-headedness.   Psychiatric/Behavioral: Negative for altered mental status, depression and substance abuse. The patient is nervous/anxious (over health issues, gets very worried when thinks about things). The patient does not have insomnia.    Allergic/Immunologic: Negative for environmental allergies and persistent infections.       Patient Care Team:  Virgilio Rivas MD as PCP - General (Internal Medicine)    Recent Hospitalizations: No recent hospitalization(s).    Depression Screen:   PHQ-2/PHQ-9 Depression Screening 12/2/2021   Little interest or pleasure in doing things 0   Feeling down, depressed, or hopeless 0   Trouble falling or staying asleep, or sleeping too much -   Feeling tired or having little energy -   Poor appetite or overeating -   Feeling bad about yourself - or that you are a failure or have let yourself or your family down -   Trouble concentrating on things, such as reading the newspaper or watching television -   Moving or speaking so slowly that other people could have noticed. Or the opposite - being so fidgety or restless that you have been moving around a lot  "more than usual -   Thoughts that you would be better off dead, or of hurting yourself in some way -   Total Score 0   If you checked off any problems, how difficult have these problems made it for you to do your work, take care of things at home, or get along with other people? -       Functional and Cognitive Screening:  Functional & Cognitive Status 12/2/2021   Do you have difficulty preparing food and eating? No   Do you have difficulty bathing yourself, getting dressed or grooming yourself? No   Do you have difficulty using the toilet? No   Do you have difficulty moving around from place to place? No   Do you have trouble with steps or getting out of a bed or a chair? No   Current Diet Well Balanced Diet   Dental Exam Up to date   Eye Exam Up to date   Exercise (times per week) 5 times per week   Current Exercises Include Walking   Current Exercise Activities Include -   Do you need help using the phone?  No   Are you deaf or do you have serious difficulty hearing?  No   Do you need help with transportation? No   Do you need help shopping? No   Do you need help preparing meals?  No   Do you need help with housework?  No   Do you need help with laundry? No   Do you need help taking your medications? No   Do you need help managing money? No   Do you ever drive or ride in a car without wearing a seat belt? No   Have you felt unusual stress, anger or loneliness in the last month? No   Who do you live with? Spouse   If you need help, do you have trouble finding someone available to you? No   Have you been bothered in the last four weeks by sexual problems? No   Do you have difficulty concentrating, remembering or making decisions? -       Does the patient have evidence of cognitive impairment? no    Does not need ASA (and currently is not on it)    Vitals:    12/02/21 1255   BP: 122/70   Pulse: 83   Resp: 12   Temp: 96.9 °F (36.1 °C)   SpO2: 98%   Weight: 49.9 kg (110 lb)   Height: 165.1 cm (65\")   PainSc:   2 "       Body mass index is 18.3 kg/m².    Visual Acuity:  No exam data present    Advanced Care Planning:  ACP discussion was held with the patient during this visit. Patient has an advance directive (not in EMR), copy requested.    Objective   Physical Exam  Vitals reviewed. Exam conducted with a chaperone present.   Constitutional:       General: She is not in acute distress.     Appearance: Normal appearance. She is well-developed. She is not ill-appearing or toxic-appearing.   HENT:      Head: Normocephalic and atraumatic.      Right Ear: Hearing, tympanic membrane, ear canal and external ear normal. There is no impacted cerumen.      Left Ear: Hearing, tympanic membrane, ear canal and external ear normal. There is no impacted cerumen.      Nose: Nose normal.      Mouth/Throat:      Mouth: Mucous membranes are moist.      Pharynx: Oropharynx is clear. Uvula midline. No oropharyngeal exudate.   Eyes:      General: Lids are normal. No scleral icterus.     Extraocular Movements: Extraocular movements intact.      Conjunctiva/sclera: Conjunctivae normal.      Pupils: Pupils are equal, round, and reactive to light.   Neck:      Thyroid: No thyroid mass, thyromegaly or thyroid tenderness.      Vascular: No carotid bruit.      Trachea: Trachea normal.   Cardiovascular:      Rate and Rhythm: Normal rate and regular rhythm.      Pulses:           Radial pulses are 2+ on the right side and 2+ on the left side.        Dorsalis pedis pulses are 2+ on the right side and 2+ on the left side.        Posterior tibial pulses are 2+ on the right side and 2+ on the left side.      Heart sounds: Normal heart sounds, S1 normal and S2 normal. No murmur heard.  No friction rub. No gallop.       Comments: Varicose veins B Lower legs    Pulmonary:      Effort: Pulmonary effort is normal. No respiratory distress.      Breath sounds: Normal breath sounds. No wheezing, rhonchi or rales.   Chest:      Chest wall: No mass.   Breasts:       Right: No inverted nipple, mass, nipple discharge, skin change, axillary adenopathy or supraclavicular adenopathy.      Left: No inverted nipple, mass, nipple discharge, skin change, axillary adenopathy or supraclavicular adenopathy.       Abdominal:      General: Bowel sounds are normal. There is no distension.      Palpations: Abdomen is soft. There is no mass.      Tenderness: There is no abdominal tenderness. There is no guarding or rebound.   Musculoskeletal:         General: Normal range of motion.      Right hand: Deformity (Mild flexor tendon contracture/ thickening) present. No tenderness. Normal range of motion. Normal strength. Normal sensation. Normal pulse.      Left hand: Deformity (Mild flexor tendon contracture/ thickening; small ganglion cyst noted over ring finger tendon on palm. ) present. No tenderness. Normal range of motion. Normal strength. Normal sensation. Normal pulse.      Cervical back: Full passive range of motion without pain, normal range of motion and neck supple.      Right lower leg: No edema.      Left lower leg: No edema.      Right foot: Normal range of motion. No deformity or bunion.      Left foot: Normal range of motion. No deformity or bunion.   Feet:      Right foot:      Skin integrity: No ulcer, blister or skin breakdown.      Left foot:      Skin integrity: No ulcer, blister or skin breakdown.   Lymphadenopathy:      Cervical: No cervical adenopathy.      Right cervical: No superficial, deep or posterior cervical adenopathy.     Left cervical: No superficial, deep or posterior cervical adenopathy.      Upper Body:      Right upper body: No supraclavicular, axillary or pectoral adenopathy.      Left upper body: No supraclavicular, axillary or pectoral adenopathy.      Lower Body: No right inguinal adenopathy. No left inguinal adenopathy.   Skin:     General: Skin is warm and dry.      Findings: No rash.   Neurological:      Mental Status: She is alert and oriented to  person, place, and time.      Cranial Nerves: No cranial nerve deficit.      Sensory: No sensory deficit.      Motor: No weakness, tremor, atrophy or abnormal muscle tone.      Gait: Gait normal.      Deep Tendon Reflexes: Reflexes are normal and symmetric.      Reflex Scores:       Patellar reflexes are 2+ on the right side and 2+ on the left side.       Achilles reflexes are 2+ on the right side and 2+ on the left side.  Psychiatric:         Attention and Perception: Attention normal.         Mood and Affect: Mood is anxious (at times, notable when discussing CT chest issues). Mood is not depressed. Affect is not angry or inappropriate.         Speech: Speech normal.         Behavior: Behavior normal.         Thought Content: Thought content normal.         Cognition and Memory: Cognition is not impaired. Memory is not impaired.         Compared to one year ago, the patient feels physical health is the same.  Compared to one year ago, the patient feels mental health is worse.    Reviewed chart for potential of high risk medication in the elderly: yes  Reviewed chart for potential of harmful drug interactions in the elderly:yes    Identification of Risk Factors:  Risk factors include: Advance Directive Discussion  Breast Cancer/Mammogram Screening  Colon Cancer Screening  Immunizations Discussed/Encouraged (specific immunizations; Tdap, Hepatitis A Vaccine/Series, Influenza, Pneumococcal 23, Shingrix and COVID19 ).    Patient Self-Management and Personalized Health Advice  The patient has been provided with information about: diet, exercise and mental health concerns and preventive services including:   · Annual Wellness Visit (AWV)  · Bone Density Measurements  · Colorectal Cancer Screening, Colonoscopy  · Screening Mammography .    Discussed the patient's BMI with her. The BMI is in the acceptable range.    Assessment/Plan   Diagnoses and all orders for this visit:    1. Healthcare maintenance (Primary)  -      DEXA Bone Density Axial; Future    2. Osteopenia of hip, unspecified laterality  -     DEXA Bone Density Axial; Future    3. Asymptomatic varicose veins of left lower extremity    4. Age-related osteoporosis without current pathological fracture  -     DEXA Bone Density Axial; Future    5. Pneumonia of right middle lobe due to infectious organism  -     CT Chest Without Contrast; Future    6. Abnormal CT of the chest  -     CT Chest Without Contrast; Future    7. Anxiety about health    8. Multiple thyroid nodules    9. Ganglion cyst of flexor tendon sheath of finger of left hand            Diagnoses and all orders for this visit:    Healthcare maintenance  -     DEXA Bone Density Axial; Future    Osteopenia of hip, unspecified laterality  -     DEXA Bone Density Axial; Future    Asymptomatic varicose veins of left lower extremity    Age-related osteoporosis without current pathological fracture  -     DEXA Bone Density Axial; Future    Pneumonia of right middle lobe due to infectious organism  -     CT Chest Without Contrast; Future    Abnormal CT of the chest  -     CT Chest Without Contrast; Future    Anxiety about health    Multiple thyroid nodules    Ganglion cyst of flexor tendon sheath of finger of left hand        Lani Lopez is a 76 y.o. female RTC in yearly CPE/AWV, review of medical issues:  1. RML and lingular aytpical pneumonia on CT angio chest 8/2021 - s/p tx with doxycycline, cough resolved. 9/2021 CT showed partial clearing only.  Will get 4 month CT later this month to eval for clearing.  If persists, will see pulmonary.  Reassured pt overall today with improved sx and normalized WBC count.   2. Thyroid nodule, 1.1cm, L lobe incidental note on CT chest, U/S 10/2021 with bx - Path was benign, reassured.  U/S in 6 months with ENT.   3. Varicose Vein, L leg - asx'ic. Monitor.   4. Osteopenia hip and osteoporosis in spine, low FRAX on 10/2019 DEXA, excellent exercise routine; hx of Fosamax x 3  years ending in 2000 -  Calcium daily, and MVI. Weight bearing exercise.  DEXA due 10/2021, order placed.   5. Hx of psoriasis - noted in distant past, no recurrence. UTD derm 2021.   6. Flexor tendon ganglion cyst L hand - asx'ic. Monitor. ? Overlying mild Dupuytren contracture.   7. HM - labs d/w pt; Flu/ Tdap/ Prevnar/ Pvax/ Shingrix/ COVID - UTD;  Hep A declines;  C-scope due 2020, has ppwk, urged to complete to schedule; Mammo (-) 10/2021, breast exam OK today; Pap D/C s/p [id number] for benign cyst; DEXA due, ordered; Hep C Ab (-) 9/2020; c/w TLC diet and exercise as she is; Preventative care plan provided to pt at end of visit      Return in about 1 year (around 12/2/2022) for Medicare Wellness, Annual physical.          Current Outpatient Medications:   •  calcium carbonate (OS-MARC) 600 MG tablet, Take 600 mg by mouth Daily., Disp: , Rfl:   •  Multiple Vitamins-Minerals (MULTIVITAMIN ADULT PO), Take  by mouth., Disp: , Rfl:   •  Omega-3 Fatty Acids (FISH OIL) 1000 MG capsule capsule, Take  by mouth Daily With Breakfast., Disp: , Rfl:

## 2021-12-16 ENCOUNTER — TELEPHONE (OUTPATIENT)
Dept: INTERNAL MEDICINE | Facility: CLINIC | Age: 76
End: 2021-12-16

## 2021-12-16 NOTE — TELEPHONE ENCOUNTER
Caller: Lnai Lopez    Relationship: Self    Best call back number: [phone]     What is the best time to reach you: ANYTIME    Who are you requesting to speak with (clinical staff, provider,  specific staff member): CLINICAL     Do you know the name of the person who called:     What was the call regarding: PATIENT CALLING STATING THAT SHE CAN GET THE CT SCAN ON 12/27 OR SHOULD SHE WAIT UNTIL 01/17. SHE WOULD LIKE TO KNOW IF SHE SHOULD COMPLETE ASAP OR WAIT A LITTLE LATER    Do you require a callback: YES

## 2021-12-17 NOTE — TELEPHONE ENCOUNTER
I think it is the patient's choice here. I do not think the 2-3 week difference in time is significant for this follow-up scan.

## 2021-12-18 ENCOUNTER — HOSPITAL ENCOUNTER (OUTPATIENT)
Dept: BONE DENSITY | Facility: HOSPITAL | Age: 76
End: 2021-12-18

## 2021-12-27 ENCOUNTER — HOSPITAL ENCOUNTER (OUTPATIENT)
Dept: CT IMAGING | Facility: HOSPITAL | Age: 76
Discharge: HOME | End: 2021-12-27
Admitting: INTERNAL MEDICINE

## 2021-12-27 DIAGNOSIS — J18.9 PNEUMONIA OF RIGHT MIDDLE LOBE DUE TO INFECTIOUS ORGANISM: ICD-10-CM

## 2021-12-27 DIAGNOSIS — R93.89 ABNORMAL CT OF THE CHEST: ICD-10-CM

## 2021-12-27 PROCEDURE — 71250 CT THORAX DX C-: CPT

## 2022-03-29 ENCOUNTER — TRANSCRIBE ORDERS (OUTPATIENT)
Dept: ADMINISTRATIVE | Facility: HOSPITAL | Age: 77
End: 2022-03-29

## 2022-03-29 DIAGNOSIS — E04.1 THYROID NODULE: Primary | ICD-10-CM

## 2022-04-19 ENCOUNTER — APPOINTMENT (OUTPATIENT)
Dept: ULTRASOUND IMAGING | Facility: HOSPITAL | Age: 77
End: 2022-04-19

## 2022-04-19 ENCOUNTER — HOSPITAL ENCOUNTER (OUTPATIENT)
Dept: ULTRASOUND IMAGING | Facility: HOSPITAL | Age: 77
Discharge: HOME | End: 2022-04-19
Admitting: OTOLARYNGOLOGY

## 2022-04-19 DIAGNOSIS — E04.1 THYROID NODULE: ICD-10-CM

## 2022-04-19 PROCEDURE — 76536 US EXAM OF HEAD AND NECK: CPT

## 2022-06-15 ENCOUNTER — HOSPITAL ENCOUNTER (OUTPATIENT)
Dept: BONE DENSITY | Facility: HOSPITAL | Age: 77
Discharge: HOME | End: 2022-06-15
Admitting: INTERNAL MEDICINE

## 2022-06-15 DIAGNOSIS — Z00.00 HEALTHCARE MAINTENANCE: ICD-10-CM

## 2022-06-15 DIAGNOSIS — M81.0 AGE-RELATED OSTEOPOROSIS WITHOUT CURRENT PATHOLOGICAL FRACTURE: ICD-10-CM

## 2022-06-15 DIAGNOSIS — M85.859 OSTEOPENIA OF HIP, UNSPECIFIED LATERALITY: ICD-10-CM

## 2022-06-15 PROCEDURE — 77080 DXA BONE DENSITY AXIAL: CPT

## 2022-06-30 ENCOUNTER — OFFICE VISIT (OUTPATIENT)
Dept: INTERNAL MEDICINE | Facility: CLINIC | Age: 77
End: 2022-06-30

## 2022-06-30 VITALS
HEART RATE: 95 BPM | OXYGEN SATURATION: 97 % | WEIGHT: 111 LBS | DIASTOLIC BLOOD PRESSURE: 80 MMHG | SYSTOLIC BLOOD PRESSURE: 128 MMHG | HEIGHT: 65 IN | BODY MASS INDEX: 18.49 KG/M2 | TEMPERATURE: 96.8 F

## 2022-06-30 DIAGNOSIS — M81.0 AGE-RELATED OSTEOPOROSIS WITHOUT CURRENT PATHOLOGICAL FRACTURE: ICD-10-CM

## 2022-06-30 DIAGNOSIS — M81.0 OSTEOPOROSIS OF LUMBAR SPINE: Primary | ICD-10-CM

## 2022-06-30 DIAGNOSIS — M85.859 OSTEOPENIA OF HIP, UNSPECIFIED LATERALITY: ICD-10-CM

## 2022-06-30 PROCEDURE — 99214 OFFICE O/P EST MOD 30 MIN: CPT | Performed by: INTERNAL MEDICINE

## 2022-06-30 RX ORDER — ALENDRONATE SODIUM 70 MG/1
70 TABLET ORAL
Qty: 4 TABLET | Refills: 5 | Status: SHIPPED | OUTPATIENT
Start: 2022-06-30 | End: 2022-07-08 | Stop reason: SDUPTHER

## 2022-06-30 RX ORDER — ALENDRONATE SODIUM 70 MG/1
70 TABLET ORAL
Qty: 4 TABLET | Refills: 5 | Status: SHIPPED | OUTPATIENT
Start: 2022-06-30 | End: 2022-06-30 | Stop reason: SDUPTHER

## 2022-06-30 NOTE — TELEPHONE ENCOUNTER
Caller: Lani Lopez    Relationship: Self    Best call back number: [phone]    Requested Prescriptions:   Requested Prescriptions     Pending Prescriptions Disp Refills   • alendronate (Fosamax) 70 MG tablet 4 tablet 5     Sig: Take 1 tablet by mouth Every 7 (Seven) Days.        Pharmacy where request should be sent: [facility] - [address] [phone] [facility] [phone]      Additional details provided by patient: PATIENT WOULD LIKE TO SEND THIS MEDICATION TO [facility] INSTEAD OF HER LOCAL PHARMACY.     Does the patient have less than a 3 day supply:  [x] Yes  [] No    Cuco Ty Rep   06/30/22 09:47 EDT

## 2022-06-30 NOTE — PROGRESS NOTES
"DEXA      HPI  Lani Lopez is a 77 y.o. female RTC in f/u:   Had DEXA recently.  Has seen results.   Noted in past in 2009 and started on Fosamax and took for about 3 years. Notes tasanjay 'I got worried about that osteonecrosis of the jaw and so I stopped'.  Pt has been diligent about exercise and c/w exercise since.   Pt is dissappointed at results on DEXA.  Is willing to consider other options.   No prior lumbar fx noted in past.   Does have questions about med options.  'I am not a big medicine person'.     Review of Systems   Musculoskeletal: Negative for back pain, joint pain and myalgias.   Gastrointestinal: Negative for abdominal pain, nausea and vomiting.   Psychiatric/Behavioral: Negative for altered mental status.       The following portions of the patient's history were reviewed and updated as appropriate: allergies, current medications, past medical history, past social history and problem list.      Current Outpatient Medications:   •  calcium carbonate (OS-MARC) 600 MG tablet, Take 600 mg by mouth Daily., Disp: , Rfl:   •  Multiple Vitamins-Minerals (MULTIVITAMIN ADULT PO), Take  by mouth., Disp: , Rfl:   •  Omega-3 Fatty Acids (FISH OIL) 1000 MG capsule capsule, Take  by mouth Daily With Breakfast., Disp: , Rfl:   •  alendronate (Fosamax) 70 MG tablet, Take 1 tablet by mouth Every 7 (Seven) Days., Disp: 4 tablet, Rfl: 5    Vitals:    06/30/22 0750   BP: 128/80   Pulse: 95   Temp: 96.8 °F (36 °C)   SpO2: 97%   Weight: 50.3 kg (111 lb)   Height: 165.1 cm (65\")     Body mass index is 18.47 kg/m².      Physical Exam  Vitals reviewed.   Constitutional:       General: She is not in acute distress.     Appearance: She is well-developed. She is not ill-appearing or toxic-appearing.      Comments: Trim body habitus     HENT:      Head: Normocephalic.   Pulmonary:      Effort: Pulmonary effort is normal. No respiratory distress.   Neurological:      Mental Status: She is alert and oriented to person, place, " and time.      Gait: Gait normal.   Psychiatric:         Mood and Affect: Mood normal.         Behavior: Behavior normal.         Thought Content: Thought content normal.         Assessment/ Plan  Diagnoses and all orders for this visit:    Osteoporosis of lumbar spine    Osteopenia of hip, unspecified laterality    Age-related osteoporosis without current pathological fracture  -     alendronate (Fosamax) 70 MG tablet; Take 1 tablet by mouth Every 7 (Seven) Days.        Return for Next scheduled follow up.      Discussion:  Lani Lopez is a 77 y.o. female RTC in f/u after 6/2022 DEXA showed progression of bone density loss.   Now has severe osteoporosis in lumbar spine with T score -3.9.  I d/w pt really absolute need for meds given severe osteoporosis.  Pt is medication hesitant but did tolerate 2-3 years of Fosamax in past. D/W pt med options and agrees to restart Fosamax weekly.  C/W Ca++D. Pt agrees to see  to augment weight bearing exercise, already goes to gym regularly.  Reviewed side effects, administration of med, and long term goals. Recheck DEXA 6/2024.      RTC as planned.

## 2022-07-08 ENCOUNTER — TELEPHONE (OUTPATIENT)
Dept: INTERNAL MEDICINE | Facility: CLINIC | Age: 77
End: 2022-07-08

## 2022-07-08 DIAGNOSIS — M81.0 AGE-RELATED OSTEOPOROSIS WITHOUT CURRENT PATHOLOGICAL FRACTURE: ICD-10-CM

## 2022-07-08 RX ORDER — ALENDRONATE SODIUM 70 MG/1
70 TABLET ORAL
Qty: 12 TABLET | Refills: 1 | Status: SHIPPED | OUTPATIENT
Start: 2022-07-08 | End: 2022-12-28

## 2022-07-08 NOTE — TELEPHONE ENCOUNTER
Caller: Lani Lopez    Relationship: Self    Best call back number: [phone]       PATIENT IS INQUIRING ABOUT GETTING HER alendronate (Fosamax) 70 MG tablet CHANGED TO A 3 MONTH PRESCRIPTION INSTEAD OF A 1 MONTH THROUGH [facility] - [address] [phone] [facility] [phone] FX. PLEASE CALL AND ADVISE PATIENT.

## 2022-11-14 ENCOUNTER — APPOINTMENT (OUTPATIENT)
Dept: WOMENS IMAGING | Facility: HOSPITAL | Age: 77
End: 2022-11-14

## 2022-11-14 PROCEDURE — 77067 SCR MAMMO BI INCL CAD: CPT | Performed by: RADIOLOGY

## 2022-11-14 PROCEDURE — 77063 BREAST TOMOSYNTHESIS BI: CPT | Performed by: RADIOLOGY

## 2022-11-16 ENCOUNTER — HOSPITAL ENCOUNTER (OUTPATIENT)
Facility: HOSPITAL | Age: 77
Setting detail: HOSPITAL OUTPATIENT SURGERY
Discharge: HOME | End: 2022-11-16
Attending: INTERNAL MEDICINE | Admitting: INTERNAL MEDICINE

## 2022-11-16 ENCOUNTER — ON CAMPUS - OUTPATIENT (OUTPATIENT)
Dept: URBAN - METROPOLITAN AREA HOSPITAL 114 | Facility: HOSPITAL | Age: 77
End: 2022-11-16
Payer: MEDICARE

## 2022-11-16 ENCOUNTER — ANESTHESIA EVENT (OUTPATIENT)
Dept: GASTROENTEROLOGY | Facility: HOSPITAL | Age: 77
End: 2022-11-16

## 2022-11-16 ENCOUNTER — ANESTHESIA (OUTPATIENT)
Dept: GASTROENTEROLOGY | Facility: HOSPITAL | Age: 77
End: 2022-11-16

## 2022-11-16 VITALS
HEART RATE: 54 BPM | WEIGHT: 108 LBS | BODY MASS INDEX: 17.36 KG/M2 | SYSTOLIC BLOOD PRESSURE: 122 MMHG | DIASTOLIC BLOOD PRESSURE: 78 MMHG | TEMPERATURE: 97.6 F | RESPIRATION RATE: 16 BRPM | OXYGEN SATURATION: 100 % | HEIGHT: 66 IN

## 2022-11-16 DIAGNOSIS — D12.3 BENIGN NEOPLASM OF TRANSVERSE COLON: ICD-10-CM

## 2022-11-16 DIAGNOSIS — D12.0 BENIGN NEOPLASM OF CECUM: ICD-10-CM

## 2022-11-16 DIAGNOSIS — Z12.11 ENCOUNTER FOR SCREENING FOR MALIGNANT NEOPLASM OF COLON: ICD-10-CM

## 2022-11-16 DIAGNOSIS — Z12.11 COLON CANCER SCREENING: ICD-10-CM

## 2022-11-16 PROCEDURE — 25010000002 PROPOFOL 10 MG/ML EMULSION: Performed by: ANESTHESIOLOGY

## 2022-11-16 PROCEDURE — 88305 TISSUE EXAM BY PATHOLOGIST: CPT | Performed by: INTERNAL MEDICINE

## 2022-11-16 PROCEDURE — 45380 COLONOSCOPY AND BIOPSY: CPT | Mod: PT | Performed by: INTERNAL MEDICINE

## 2022-11-16 RX ORDER — SODIUM CHLORIDE 0.9 % (FLUSH) 0.9 %
10 SYRINGE (ML) INJECTION AS NEEDED
Status: DISCONTINUED | OUTPATIENT
Start: 2022-11-16 | End: 2022-11-16 | Stop reason: HOSPADM

## 2022-11-16 RX ORDER — PROPOFOL 10 MG/ML
VIAL (ML) INTRAVENOUS CONTINUOUS PRN
Status: DISCONTINUED | OUTPATIENT
Start: 2022-11-16 | End: 2022-11-16 | Stop reason: SURG

## 2022-11-16 RX ORDER — LIDOCAINE HYDROCHLORIDE 20 MG/ML
INJECTION, SOLUTION INFILTRATION; PERINEURAL AS NEEDED
Status: DISCONTINUED | OUTPATIENT
Start: 2022-11-16 | End: 2022-11-16 | Stop reason: SURG

## 2022-11-16 RX ORDER — PROPOFOL 10 MG/ML
VIAL (ML) INTRAVENOUS AS NEEDED
Status: DISCONTINUED | OUTPATIENT
Start: 2022-11-16 | End: 2022-11-16 | Stop reason: SURG

## 2022-11-16 RX ORDER — SODIUM CHLORIDE, SODIUM LACTATE, POTASSIUM CHLORIDE, CALCIUM CHLORIDE 600; 310; 30; 20 MG/100ML; MG/100ML; MG/100ML; MG/100ML
30 INJECTION, SOLUTION INTRAVENOUS CONTINUOUS PRN
Status: DISCONTINUED | OUTPATIENT
Start: 2022-11-16 | End: 2022-11-16 | Stop reason: HOSPADM

## 2022-11-16 RX ORDER — SODIUM CHLORIDE 0.9 % (FLUSH) 0.9 %
10 SYRINGE (ML) INJECTION EVERY 12 HOURS SCHEDULED
Status: DISCONTINUED | OUTPATIENT
Start: 2022-11-16 | End: 2022-11-16 | Stop reason: HOSPADM

## 2022-11-16 RX ADMIN — SODIUM CHLORIDE, POTASSIUM CHLORIDE, SODIUM LACTATE AND CALCIUM CHLORIDE 30 ML/HR: 600; 310; 30; 20 INJECTION, SOLUTION INTRAVENOUS at 10:55

## 2022-11-16 RX ADMIN — PROPOFOL 70 MG: 10 INJECTION, EMULSION INTRAVENOUS at 11:02

## 2022-11-16 RX ADMIN — LIDOCAINE HYDROCHLORIDE 30 MG: 20 INJECTION, SOLUTION INFILTRATION; PERINEURAL at 11:01

## 2022-11-16 RX ADMIN — Medication 150 MCG/KG/MIN: at 11:02

## 2022-11-16 NOTE — ANESTHESIA PREPROCEDURE EVALUATION
Anesthesia Evaluation     Patient summary reviewed                Airway   No difficulty expected  Dental      Pulmonary    Cardiovascular     Rhythm: regular        Neuro/Psych  GI/Hepatic/Renal/Endo    (+)   thyroid problem (nodules)     Musculoskeletal         ROS comment: osteoporosis  Abdominal    Substance History      OB/GYN          Other                        Anesthesia Plan    ASA 2     MAC       Anesthetic plan, risks, benefits, and alternatives have been provided, discussed and informed consent has been obtained with: patient.        CODE STATUS:

## 2022-11-16 NOTE — H&P
[facility]   HISTORY AND PHYSICAL    Patient Name: Lani Lopez  : 1945  MRN: [id number]  Primary Care Physician:  Virgilio Rivas MD  Date of admission: 2022    Subjective   Subjective     Chief Complaint: screening for colon cancer     History of Present Illness  The patient presents with no gastrointestinal  Symptoms or issues at this time   Review of Systems   All other systems reviewed and are negative.       Personal History     Past Medical History:   Diagnosis Date   • Osteopenia of multiple sites 10/03/2019    dx'd in ~; s/p Fosamax x 3 years stopping in    • Ovarian cyst     benign; s/p KALANI-BSO   • Pneumonia    • Psoriasis     resolved   • Shingles    • Skin cancer of arm 2021       Past Surgical History:   Procedure Laterality Date   • BREAST BIOPSY Left     benign   • CATARACT EXTRACTION, BILATERAL     • COLONOSCOPY  2011   • TOTAL ABDOMINAL HYSTERECTOMY WITH SALPINGO OOPHORECTOMY     • US GUIDED FINE NEEDLE ASPIRATION  10/07/2021       Family History: family history includes Breast cancer in her sister; Colon cancer in her maternal grandfather; Emphysema in her father; Hypertension in her mother; Hypothyroidism in her mother; Leukemia in her mother and niece; Melanoma in her daughter; No Known Problems in her daughter and daughter; Osteoporosis in her mother; Prostate cancer in her father. Otherwise pertinent FHx was reviewed and not pertinent to current issue.    Social History:  reports that she quit smoking about 28 years ago. Her smoking use included cigarettes. She has never used smokeless tobacco. She reports current alcohol use. She reports that she does not use drugs.    Home Medications:  alendronate, calcium carbonate, fish oil, and multivitamin with minerals    Allergies:  No Known Allergies    Objective    Objective     Vitals:   Temp:  [97.6 °F (36.4 °C)] 97.6 °F (36.4 °C)  Heart Rate:  [67] 67  Resp:  [16] 16  BP: (126)/(67)  126/67    Physical Exam  HENT:      Right Ear: External ear normal.      Left Ear: External ear normal.      Mouth/Throat:      Pharynx: Oropharynx is clear.   Eyes:      Conjunctiva/sclera: Conjunctivae normal.   Cardiovascular:      Rate and Rhythm: Normal rate.      Pulses: Normal pulses.   Pulmonary:      Effort: Pulmonary effort is normal.   Abdominal:      General: Abdomen is flat.   Skin:     General: Skin is warm.   Neurological:      General: No focal deficit present.      Mental Status: She is alert.   Psychiatric:         Mood and Affect: Mood normal.         Result Review    Result Review:  I have personally reviewed the results from the time of this admission to 11/16/2022 11:01 EST and agree with these findings:  []  Laboratory list / accordion  []  Microbiology  []  Radiology  []  EKG/Telemetry   []  Cardiology/Vascular   []  Pathology  []  Old records  []  Other:      Assessment & Plan   Assessment / Plan     Brief Patient Summary:  Lani Lopez is a 77 y.o. female   Age related colon cancer screening    Active Hospital Problems:  There are no active hospital problems to display for this patient.    Plan: Colonoscopy risks, alternatives and benefits discussed with patient and the patient is agreeable to having procedure done.    DVT prophylaxis:  No DVT prophylaxis order currently exists.    CODE STATUS:       Admission Status:  I believe this patient meets outpatient  status.    Hitesh Grier MD

## 2022-11-16 NOTE — DISCHARGE INSTRUCTIONS
For the next 24 hours patient needs to be with a responsible adult.    For 24 hours DO NOT drive, operate machinery, appliances, drink alcohol, make important decisions or sign legal documents.    Start with a light or bland diet if you are feeling sick to your stomach otherwise advance to regular diet as tolerated.    Follow recommendations on procedure report if provided by your doctor.    Call Dr. Grier for problems [phone]    Problems may include but not limited to: large amounts of bleeding, trouble breathing, repeated vomiting, severe unrelieved pain, fever or chills.

## 2022-11-16 NOTE — ANESTHESIA POSTPROCEDURE EVALUATION
Patient: Lani Lopez    Procedure Summary     Date: 11/16/22 Room / Location:  [facility] /  [facility]    Anesthesia Start: 1059 Anesthesia Stop: 1122    Procedure: COLONOSCOPY WITH COLD BIOPSY POLYPECTOMYIES Diagnosis:     Surgeons: Hitesh Grier MD Provider: Klaus Maloney MD    Anesthesia Type: MAC ASA Status: 2          Anesthesia Type: MAC    Vitals  Vitals Value Taken Time   /78 11/16/22 1140   Temp     Pulse 54 11/16/22 1140   Resp 16 11/16/22 1140   SpO2 100 % 11/16/22 1140           Post Anesthesia Care and Evaluation    Patient location during evaluation: PACU  Patient participation: complete - patient participated  Level of consciousness: awake  Pain score: 1  Pain management: adequate    Airway patency: patent  Anesthetic complications: No anesthetic complications  PONV Status: none  Cardiovascular status: acceptable  Respiratory status: acceptable  Hydration status: acceptable

## 2022-11-17 LAB
LAB AP CASE REPORT: NORMAL
PATH REPORT.FINAL DX SPEC: NORMAL
PATH REPORT.GROSS SPEC: NORMAL

## 2022-11-21 DIAGNOSIS — I10 HYPERTENSION, UNSPECIFIED TYPE: Primary | ICD-10-CM

## 2022-11-24 LAB
ALBUMIN SERPL-MCNC: 4.2 G/DL (ref 3.5–5.2)
ALBUMIN/GLOB SERPL: 1.6 G/DL
ALP SERPL-CCNC: 74 U/L (ref 39–117)
ALT SERPL-CCNC: 19 U/L (ref 1–33)
APPEARANCE UR: CLEAR
AST SERPL-CCNC: 27 U/L (ref 1–32)
BACTERIA #/AREA URNS HPF: NORMAL /HPF
BASOPHILS # BLD AUTO: 0.08 10*3/MM3 (ref 0–0.2)
BASOPHILS NFR BLD AUTO: 1.2 % (ref 0–1.5)
BILIRUB SERPL-MCNC: 0.4 MG/DL (ref 0–1.2)
BILIRUB UR QL STRIP: NEGATIVE
BUN SERPL-MCNC: 11 MG/DL (ref 8–23)
BUN/CREAT SERPL: 13.8 (ref 7–25)
CALCIUM SERPL-MCNC: 9.3 MG/DL (ref 8.6–10.5)
CASTS URNS QL MICRO: NORMAL /LPF
CHLORIDE SERPL-SCNC: 101 MMOL/L (ref 98–107)
CHOLEST SERPL-MCNC: 205 MG/DL (ref 0–200)
CO2 SERPL-SCNC: 28.6 MMOL/L (ref 22–29)
COLOR UR: YELLOW
CREAT SERPL-MCNC: 0.8 MG/DL (ref 0.57–1)
EGFRCR SERPLBLD CKD-EPI 2021: 76.9 ML/MIN/1.73
EOSINOPHIL # BLD AUTO: 0.26 10*3/MM3 (ref 0–0.4)
EOSINOPHIL NFR BLD AUTO: 4 % (ref 0.3–6.2)
EPI CELLS #/AREA URNS HPF: NORMAL /HPF (ref 0–10)
ERYTHROCYTE [DISTWIDTH] IN BLOOD BY AUTOMATED COUNT: 11.8 % (ref 12.3–15.4)
GLOBULIN SER CALC-MCNC: 2.7 GM/DL
GLUCOSE SERPL-MCNC: 89 MG/DL (ref 65–99)
GLUCOSE UR QL STRIP: NEGATIVE
HCT VFR BLD AUTO: 40.1 % (ref 34–46.6)
HDLC SERPL-MCNC: 78 MG/DL (ref 40–60)
HGB BLD-MCNC: 13.4 G/DL (ref 12–15.9)
HGB UR QL STRIP: NEGATIVE
IMM GRANULOCYTES # BLD AUTO: 0.02 10*3/MM3 (ref 0–0.05)
IMM GRANULOCYTES NFR BLD AUTO: 0.3 % (ref 0–0.5)
KETONES UR QL STRIP: NEGATIVE
LDLC SERPL CALC-MCNC: 118 MG/DL (ref 0–100)
LEUKOCYTE ESTERASE UR QL STRIP: NEGATIVE
LYMPHOCYTES # BLD AUTO: 1.98 10*3/MM3 (ref 0.7–3.1)
LYMPHOCYTES NFR BLD AUTO: 30.4 % (ref 19.6–45.3)
MCH RBC QN AUTO: 31.6 PG (ref 26.6–33)
MCHC RBC AUTO-ENTMCNC: 33.4 G/DL (ref 31.5–35.7)
MCV RBC AUTO: 94.6 FL (ref 79–97)
MICRO URNS: ABNORMAL
MICRO URNS: ABNORMAL
MONOCYTES # BLD AUTO: 0.76 10*3/MM3 (ref 0.1–0.9)
MONOCYTES NFR BLD AUTO: 11.7 % (ref 5–12)
NEUTROPHILS # BLD AUTO: 3.42 10*3/MM3 (ref 1.7–7)
NEUTROPHILS NFR BLD AUTO: 52.4 % (ref 42.7–76)
NITRITE UR QL STRIP: NEGATIVE
NRBC BLD AUTO-RTO: 0 /100 WBC (ref 0–0.2)
PH UR STRIP: 8 [PH] (ref 5–7.5)
PLATELET # BLD AUTO: 255 10*3/MM3 (ref 140–450)
POTASSIUM SERPL-SCNC: 4.3 MMOL/L (ref 3.5–5.2)
PROT SERPL-MCNC: 6.9 G/DL (ref 6–8.5)
PROT UR QL STRIP: NEGATIVE
RBC # BLD AUTO: 4.24 10*6/MM3 (ref 3.77–5.28)
RBC #/AREA URNS HPF: NORMAL /HPF (ref 0–2)
SODIUM SERPL-SCNC: 136 MMOL/L (ref 136–145)
SP GR UR STRIP: 1.01 (ref 1–1.03)
TRIGL SERPL-MCNC: 50 MG/DL (ref 0–150)
URINALYSIS REFLEX: ABNORMAL
UROBILINOGEN UR STRIP-MCNC: 0.2 MG/DL (ref 0.2–1)
VLDLC SERPL CALC-MCNC: 9 MG/DL (ref 5–40)
WBC # BLD AUTO: 6.52 10*3/MM3 (ref 3.4–10.8)
WBC #/AREA URNS HPF: NORMAL /HPF (ref 0–5)

## 2022-12-06 ENCOUNTER — OFFICE VISIT (OUTPATIENT)
Dept: INTERNAL MEDICINE | Facility: CLINIC | Age: 77
End: 2022-12-06

## 2022-12-06 VITALS
BODY MASS INDEX: 17.71 KG/M2 | WEIGHT: 110.2 LBS | SYSTOLIC BLOOD PRESSURE: 92 MMHG | OXYGEN SATURATION: 98 % | TEMPERATURE: 97.9 F | DIASTOLIC BLOOD PRESSURE: 60 MMHG | HEIGHT: 66 IN | HEART RATE: 66 BPM

## 2022-12-06 DIAGNOSIS — Z00.01 ENCOUNTER FOR GENERAL ADULT MEDICAL EXAMINATION WITH ABNORMAL FINDINGS: Primary | ICD-10-CM

## 2022-12-06 DIAGNOSIS — Z00.00 ENCOUNTER FOR SUBSEQUENT ANNUAL WELLNESS VISIT (AWV) IN MEDICARE PATIENT: ICD-10-CM

## 2022-12-06 DIAGNOSIS — M85.859 OSTEOPENIA OF HIP, UNSPECIFIED LATERALITY: ICD-10-CM

## 2022-12-06 DIAGNOSIS — I83.92 ASYMPTOMATIC VARICOSE VEINS OF LEFT LOWER EXTREMITY: ICD-10-CM

## 2022-12-06 DIAGNOSIS — M81.0 OSTEOPOROSIS OF LUMBAR SPINE: ICD-10-CM

## 2022-12-06 DIAGNOSIS — E04.2 MULTIPLE THYROID NODULES: ICD-10-CM

## 2022-12-06 PROBLEM — J18.9 PNEUMONIA OF RIGHT MIDDLE LOBE DUE TO INFECTIOUS ORGANISM: Status: RESOLVED | Noted: 2021-09-16 | Resolved: 2022-12-06

## 2022-12-06 PROCEDURE — 1159F MED LIST DOCD IN RCRD: CPT | Performed by: INTERNAL MEDICINE

## 2022-12-06 PROCEDURE — 99397 PER PM REEVAL EST PAT 65+ YR: CPT | Performed by: INTERNAL MEDICINE

## 2022-12-06 PROCEDURE — G0439 PPPS, SUBSEQ VISIT: HCPCS | Performed by: INTERNAL MEDICINE

## 2022-12-06 PROCEDURE — 1170F FXNL STATUS ASSESSED: CPT | Performed by: INTERNAL MEDICINE

## 2022-12-06 NOTE — PROGRESS NOTES
Subjective     Chief Complaint   Patient presents with   • Medicare Wellness-subsequent     Review of Medical Issues       HPI:  Lani Lopez is a 77 y.o. female RTC in yearly CPE/ AWV, review of medical issues:   Has been doing well overall. Pt notes health has been good.  Has not had URI issues at all the Fall.   1. Osteoporosis - has started back on Fosamax.  No issues tolerating.  No stomach upset. Still on Ca++D.  Yardwork, tennis, walking and '10K steps/ day'.   2. Colon polyps - noted on 11/2022 with 2 polyps. Both were TA's.   Is worried well. Wonders what can do to prevent.   3. Thyroid nodule, 1.1cm, L lobe incidental note on CT chest, U/S 10/2021 with bx - Path was benign 10/2021. Saw ENT 4/2022 with US, stable. Back in one year.  NO issues swallowing. NO masses felt.   4. Varicose Vein, L leg - asx'ic. Is larger at focal spot on L leg, but no pain. No skin changes. Does not want to address surgically.   5. Hx of psoriasis - noted in distant past, no recurrence. Sees derm q 6 months. NO recurrence of psoriasis.     The following portions of the patient's history were reviewed and updated as appropriate: allergies, current medications, past family history, past medical history, past social history, past surgical history and problem list.    Review of Systems   Constitutional: Negative for chills and fever.   HENT: Negative for hearing loss.    Eyes: Negative for discharge, double vision, pain and redness.        Last eye exam ~1/2022; will see Dr. Bazzi.    Cardiovascular: Negative for chest pain, dyspnea on exertion, irregular heartbeat, leg swelling, near-syncope, palpitations and syncope.   Skin: Negative for rash and suspicious lesions.   Neurological: Negative for dizziness, headaches and light-headedness.       Patient Care Team:  Virgilio Rivas MD as PCP - General (Internal Medicine)    Recent Hospitalizations: No recent hospitalization(s).    Depression Screen:   PHQ-2/PHQ-9 Depression  Screening 12/6/2022   Retired PHQ-9 Total Score -   Retired Total Score -   Little Interest or Pleasure in Doing Things 0-->not at all   Feeling Down, Depressed or Hopeless 0-->not at all   PHQ-9: Brief Depression Severity Measure Score 0       Functional and Cognitive Screening:  Functional & Cognitive Status 12/6/2022   Do you have difficulty preparing food and eating? No   Do you have difficulty bathing yourself, getting dressed or grooming yourself? No   Do you have difficulty using the toilet? No   Do you have difficulty moving around from place to place? No   Do you have trouble with steps or getting out of a bed or a chair? No   Current Diet Well Balanced Diet   Dental Exam Up to date   Eye Exam Up to date   Exercise (times per week) 7 times per week   Current Exercises Include House Cleaning;Walking   Current Exercise Activities Include -   Do you need help using the phone?  No   Are you deaf or do you have serious difficulty hearing?  No   Do you need help with transportation? No   Do you need help shopping? No   Do you need help preparing meals?  No   Do you need help with housework?  No   Do you need help with laundry? No   Do you need help taking your medications? No   Do you need help managing money? No   Do you ever drive or ride in a car without wearing a seat belt? No   Have you felt unusual stress, anger or loneliness in the last month? No   Who do you live with? Spouse   If you need help, do you have trouble finding someone available to you? No   Have you been bothered in the last four weeks by sexual problems? No   Do you have difficulty concentrating, remembering or making decisions? No       Does the patient have evidence of cognitive impairment? no    Does not need ASA (and currently is not on it)    Vitals:    12/06/22 1027   BP: 92/60   BP Location: Left arm   Patient Position: Sitting   Cuff Size: Small Adult   Pulse: 66   Temp: 97.9 °F (36.6 °C)   TempSrc: Oral   SpO2: 98%   Weight: 50 kg  "(110 lb 3.2 oz)   Height: 166.4 cm (65.5\")       Body mass index is 18.06 kg/m².    Visual Acuity:  No results found.    Advanced Care Planning:  ACP discussion was held with the patient during this visit. Patient has an advance directive (not in EMR), copy requested.    Objective   Physical Exam  Vitals reviewed. Exam conducted with a chaperone present.   Constitutional:       General: She is not in acute distress.     Appearance: Normal appearance. She is well-developed. She is not ill-appearing or toxic-appearing.   HENT:      Head: Normocephalic and atraumatic.      Right Ear: Hearing, tympanic membrane, ear canal and external ear normal.      Left Ear: Hearing, tympanic membrane, ear canal and external ear normal.      Nose: Nose normal.      Mouth/Throat:      Mouth: Mucous membranes are moist.      Pharynx: Oropharynx is clear. Uvula midline. No oropharyngeal exudate.   Eyes:      General: Lids are normal. No scleral icterus.     Extraocular Movements: Extraocular movements intact.      Conjunctiva/sclera: Conjunctivae normal.      Pupils: Pupils are equal, round, and reactive to light.   Neck:      Thyroid: No thyroid mass or thyromegaly.      Vascular: No carotid bruit.      Trachea: Trachea normal.   Cardiovascular:      Rate and Rhythm: Normal rate and regular rhythm.      Pulses:           Radial pulses are 2+ on the right side and 2+ on the left side.        Dorsalis pedis pulses are 2+ on the right side and 2+ on the left side.        Posterior tibial pulses are 2+ on the right side and 2+ on the left side.      Heart sounds: Normal heart sounds, S1 normal and S2 normal. No murmur heard.    No friction rub. No gallop.      Comments: Diffuse spider and varicose veins in BLE  Pulmonary:      Effort: Pulmonary effort is normal. No respiratory distress.      Breath sounds: Normal breath sounds. No wheezing, rhonchi or rales.   Chest:      Chest wall: No mass.   Breasts:     Right: No inverted nipple, mass, " nipple discharge or skin change.      Left: No inverted nipple, mass, nipple discharge or skin change.   Abdominal:      General: Bowel sounds are normal. There is no distension.      Palpations: Abdomen is soft. There is no mass.      Tenderness: There is no abdominal tenderness. There is no guarding or rebound.   Musculoskeletal:         General: Normal range of motion.      Cervical back: Full passive range of motion without pain, normal range of motion and neck supple.      Right lower leg: No edema.      Left lower leg: No edema.      Right foot: Normal range of motion. No bunion.      Left foot: Normal range of motion. No bunion.   Feet:      Right foot:      Skin integrity: No ulcer, blister or skin breakdown.      Left foot:      Skin integrity: No ulcer, blister or skin breakdown.   Lymphadenopathy:      Cervical: No cervical adenopathy.      Right cervical: No superficial, deep or posterior cervical adenopathy.     Left cervical: No superficial, deep or posterior cervical adenopathy.      Upper Body:      Right upper body: No supraclavicular, axillary or pectoral adenopathy.      Left upper body: No supraclavicular, axillary or pectoral adenopathy.      Lower Body: No right inguinal adenopathy. No left inguinal adenopathy.   Skin:     General: Skin is warm and dry.      Findings: No rash.   Neurological:      Mental Status: She is alert and oriented to person, place, and time.      Cranial Nerves: No cranial nerve deficit.      Sensory: No sensory deficit.      Motor: No weakness, tremor, atrophy or abnormal muscle tone.      Gait: Gait normal.      Deep Tendon Reflexes: Reflexes are normal and symmetric.      Reflex Scores:       Patellar reflexes are 2+ on the right side and 2+ on the left side.       Achilles reflexes are 2+ on the right side and 2+ on the left side.  Psychiatric:         Mood and Affect: Mood normal.         Behavior: Behavior normal.         Thought Content: Thought content normal.          Compared to one year ago, the patient feels physical health is the same.  Compared to one year ago, the patient feels mental health is the same.    Reviewed chart for potential of high risk medication in the elderly: yes  Reviewed chart for potential of harmful drug interactions in the elderly:yes    Identification of Risk Factors:  Risk factors include: Advance Directive Discussion  Breast Cancer/Mammogram Screening  Colon Cancer Screening  Immunizations Discussed/Encouraged (specific immunizations; Tdap, Hepatitis A Vaccine/Series, Influenza, Pneumococcal 23, Shingrix and COVID19 ).    Patient Self-Management and Personalized Health Advice  The patient has been provided with information about: diet and exercise and preventive services including:   · Annual Wellness Visit (AWV)  · Bone Density Measurements  · Colorectal Cancer Screening, Colonoscopy  · Screening Mammography   · Screening Pap Tests.    Discussed the patient's BMI with her. The BMI is in the acceptable range.    Assessment & Plan   Diagnoses and all orders for this visit:    1. Encounter for general adult medical examination with abnormal findings (Primary)    2. Osteoporosis of lumbar spine    3. Encounter for subsequent annual wellness visit (AWV) in Medicare patient    4. Osteopenia of hip, unspecified laterality    5. Multiple thyroid nodules    6. Asymptomatic varicose veins of left lower extremity            Diagnoses and all orders for this visit:    Encounter for general adult medical examination with abnormal findings    Osteoporosis of lumbar spine    Encounter for subsequent annual wellness visit (AWV) in Medicare patient    Osteopenia of hip, unspecified laterality    Multiple thyroid nodules    Asymptomatic varicose veins of left lower extremity    Other orders  -     Multiple Vitamins-Minerals (ZINC PO); Take 1 tablet by mouth Daily.  -     VITAMIN D PO; Take 1 tablet by mouth Daily.      Lani Lopez is a 77 y.o. female RTC  in yearly CPE/ AWV, review of medical issues:     1. Osteoporosis, 6/2022 DEXA showed progression of bone density loss, severe osteoporosis in lumbar spine with T score -3.9 - on Fosamax, good tolerance.  Recall ~2-3 years of Fosamax in past. C/W Ca++D. Remains active daily with weight bearing exercise. Recheck DEXA 6/2024.    2. Colon polyps, 11/2022 with 2 polyps, TA's - reassured pt that these were pre-cancerous and good news that they are removed.  Reiterated guidelines and appropriateness of 5 year f/u C-scope.   3. RML and lingular aytpical pneumonia on CT angio chest 8/2021 - s/p tx with doxycycline, cough resolved. 9/2021 CT showed partial clearing only.  CT chest 12/2021 showed Stable scarring and bronchiectasis within the right middle lobe and lingula....probably chronic. The bronchial wall thickening and tree-in-bud nodular infiltrate previously seen within the right middle lobe and lingula has cleared. No additional imaging needed.   4. Thyroid nodule, 1.1cm, L lobe incidental note on CT chest, U/S 10/2021 with bx - Path was benign 10/2021. S/P ENT 4/2022 with US, stable.  U/S and f/u with ENT in one year.   5. Varicose Veins B, focal large varicosity L leg - asx'ic. Monitor. Declines vein clinic eval.  6. Hx of psoriasis - noted in distant past, no recurrence. F/U derm q 6 months.   7. HM - labs d/w pt; Flu/ Tdap/ Prevnar/ Pvax/ Shingrix/ COVID - UTD;  COVID updated booster advised; Hep A declined;  C-scope 11/2022 with 2 polyps, TA's --> 5 years; Mammo (-) 11/2022, breast exam OK today; Pap D/C s/p KALANI-BSO for benign cyst; DEXA due 6/2024; Hep C Ab (-) 9/2020; c/w TLC diet and exercise as she is; Preventative care plan provided to pt at end of visit    Return in about 1 year (around 12/6/2023) for Annual physical, Medicare Wellness.          Current Outpatient Medications:   •  alendronate (Fosamax) 70 MG tablet, Take 1 tablet by mouth Every 7 (Seven) Days., Disp: 12 tablet, Rfl: 1  •  calcium  carbonate (OS-MARC) 600 MG tablet, Take 600 mg by mouth Daily., Disp: , Rfl:   •  Multiple Vitamins-Minerals (MULTIVITAMIN ADULT PO), Take  by mouth., Disp: , Rfl:   •  Multiple Vitamins-Minerals (ZINC PO), Take 1 tablet by mouth Daily., Disp: , Rfl:   •  Omega-3 Fatty Acids (FISH OIL) 1000 MG capsule capsule, Take  by mouth Daily With Breakfast., Disp: , Rfl:   •  VITAMIN D PO, Take 1 tablet by mouth Daily., Disp: , Rfl:

## 2022-12-06 NOTE — PATIENT INSTRUCTIONS
Medicare Wellness  Personal Prevention Plan of Service     Date of Office Visit:    Encounter Provider:  Virgilio Rivas MD  Place of Service:  [facility]  Patient Name: Lani Lopez  :  1945    As part of the Medicare Wellness portion of your visit today, we are providing you with this personalized preventive plan of services (PPPS). This plan is based upon recommendations of the [place] Preventive Services Task Force (USPSTF) and the Advisory Committee on Immunization Practices (ACIP).    This lists the preventive care services that should be considered, and provides dates of when you are due. Items listed as completed are up-to-date and do not require any further intervention.    Health Maintenance   Topic Date Due    COVID-19 Vaccine (4 - Booster for Pfizer series) 2021    MAMMOGRAM  2023    ANNUAL WELLNESS VISIT  2023    DXA SCAN  06/15/2024    TDAP/TD VACCINES (2 - Td or Tdap) 2027    COLORECTAL CANCER SCREENING  2032    HEPATITIS C SCREENING  Completed    INFLUENZA VACCINE  Completed    Pneumococcal Vaccine 65+  Completed    ZOSTER VACCINE  Completed       No orders of the defined types were placed in this encounter.      Return in about 1 year (around 2023) for Annual physical, Medicare Wellness.

## 2022-12-28 DIAGNOSIS — M81.0 AGE-RELATED OSTEOPOROSIS WITHOUT CURRENT PATHOLOGICAL FRACTURE: ICD-10-CM

## 2022-12-28 RX ORDER — ALENDRONATE SODIUM 70 MG/1
TABLET ORAL
Qty: 12 TABLET | Refills: 3 | Status: SHIPPED | OUTPATIENT
Start: 2022-12-28

## 2023-01-17 ENCOUNTER — TRANSCRIBE ORDERS (OUTPATIENT)
Dept: ADMINISTRATIVE | Facility: HOSPITAL | Age: 78
End: 2023-01-17
Payer: MEDICARE

## 2023-01-17 DIAGNOSIS — E04.1 NONTOXIC UNINODULAR GOITER: Primary | ICD-10-CM

## 2023-04-10 ENCOUNTER — HOSPITAL ENCOUNTER (OUTPATIENT)
Dept: ULTRASOUND IMAGING | Facility: HOSPITAL | Age: 78
Discharge: HOME | End: 2023-04-10
Admitting: OTOLARYNGOLOGY
Payer: MEDICARE

## 2023-04-10 DIAGNOSIS — E04.1 NONTOXIC UNINODULAR GOITER: ICD-10-CM

## 2023-04-10 PROCEDURE — 76536 US EXAM OF HEAD AND NECK: CPT

## 2023-11-15 ENCOUNTER — APPOINTMENT (OUTPATIENT)
Dept: WOMENS IMAGING | Facility: HOSPITAL | Age: 78
End: 2023-11-15
Payer: MEDICARE

## 2023-11-15 PROCEDURE — 77067 SCR MAMMO BI INCL CAD: CPT | Performed by: RADIOLOGY

## 2023-11-15 PROCEDURE — 77063 BREAST TOMOSYNTHESIS BI: CPT | Performed by: RADIOLOGY

## 2023-11-29 DIAGNOSIS — M81.0 AGE-RELATED OSTEOPOROSIS WITHOUT CURRENT PATHOLOGICAL FRACTURE: ICD-10-CM

## 2023-11-29 RX ORDER — ALENDRONATE SODIUM 70 MG/1
TABLET ORAL
Qty: 12 TABLET | Refills: 3 | Status: SHIPPED | OUTPATIENT
Start: 2023-11-29

## 2023-12-12 ENCOUNTER — OFFICE VISIT (OUTPATIENT)
Dept: INTERNAL MEDICINE | Facility: CLINIC | Age: 78
End: 2023-12-12
Payer: MEDICARE

## 2023-12-12 VITALS
TEMPERATURE: 100.8 F | DIASTOLIC BLOOD PRESSURE: 70 MMHG | OXYGEN SATURATION: 98 % | HEART RATE: 74 BPM | BODY MASS INDEX: 18.48 KG/M2 | SYSTOLIC BLOOD PRESSURE: 130 MMHG | WEIGHT: 115 LBS | HEIGHT: 66 IN

## 2023-12-12 DIAGNOSIS — J06.9 VIRAL URI: Primary | ICD-10-CM

## 2023-12-12 DIAGNOSIS — R50.9 FEVER, UNSPECIFIED FEVER CAUSE: ICD-10-CM

## 2023-12-12 LAB
EXPIRATION DATE: NORMAL
FLUAV AG UPPER RESP QL IA.RAPID: NOT DETECTED
FLUBV AG UPPER RESP QL IA.RAPID: NOT DETECTED
INTERNAL CONTROL: NORMAL
Lab: NORMAL
SARS-COV-2 AG UPPER RESP QL IA.RAPID: NOT DETECTED

## 2023-12-12 PROCEDURE — 99213 OFFICE O/P EST LOW 20 MIN: CPT | Performed by: STUDENT IN AN ORGANIZED HEALTH CARE EDUCATION/TRAINING PROGRAM

## 2023-12-12 RX ORDER — COVID-19 ANTIGEN TEST
1 KIT MISCELLANEOUS ONCE
Qty: 2 KIT | Refills: 0 | Status: SHIPPED | OUTPATIENT
Start: 2023-12-12 | End: 2023-12-12

## 2023-12-12 NOTE — PROGRESS NOTES
"  Mark Brown M.D.  Internal Medicine  [facility]  [address], [address]  [place] [address]  [phone]      Chief Complaint  Fever (Fever of 102 at home ), Generalized Body Aches, and Headache    SUBJECTIVE    History of Present Illness    Lani Lopez is a 78 y.o. female osteopenia, thyroid nodules who presents to the office today as an established patient of Dr Virgilio Rivas MD here today for an acute care visit.     Fever, body aches and headache. Tmax of 102 F at home. Her  is also sick. Started feeling \"terrible\" yesterday and fever. She took a tylenol and Aspirin with some relief of symptoms. No shortness of breath. Some cough. No sore throat. Eyes hurt. No congestion.     Review of Systems    No Known Allergies     Outpatient Medications Marked as Taking for the 12/12/23 encounter (Office Visit) with Mark Brown MD   Medication Sig Dispense Refill    alendronate (FOSAMAX) 70 MG tablet TAKE 1 TABLET EVERY 7 DAYS 12 tablet 3    calcium carbonate (OS-MARC) 600 MG tablet Take 1 tablet by mouth Daily.      Multiple Vitamins-Minerals (MULTIVITAMIN ADULT PO) Take  by mouth.      Multiple Vitamins-Minerals (ZINC PO) Take 1 tablet by mouth Daily.      Omega-3 Fatty Acids (FISH OIL) 1000 MG capsule capsule Take  by mouth Daily With Breakfast.      VITAMIN D PO Take 1 tablet by mouth Daily.          Past Medical History:   Diagnosis Date    Multiple thyroid nodules     Osteopenia of multiple sites 10/03/2019    dx'd in ~1997; s/p Fosamax x 3 years stopping in 2000    Ovarian cyst 1997    benign; s/p KALANI-BSO    Pneumonia     Pneumonia of right middle lobe due to infectious organism 9/16/2021    S/p tx 8/2021. CT chest 9/2021 CT chest shows Partial clearing of the right middle lobe and lingula. Still some areas of increased density in the right middle lobe and lingula with some bronchiectasis. Cleared on 12/2021 CT.    Psoriasis     resolved    Shingles     Skin cancer of arm " "03/2021     Past Surgical History:   Procedure Laterality Date    BREAST BIOPSY Left 2000    benign    CATARACT EXTRACTION, BILATERAL  2015    COLONOSCOPY  11/24/2011    COLONOSCOPY N/A 11/16/2022    Procedure: COLONOSCOPY WITH COLD BIOPSY POLYPECTOMYIES;  Surgeon: Hitesh Grier MD;  Location: [facility];  Service: Gastroenterology;  Laterality: N/A;  PRE: SCREENING  POST: POLYPS, TORTOUS COLON    TOTAL ABDOMINAL HYSTERECTOMY WITH SALPINGO OOPHORECTOMY  1997    US GUIDED FINE NEEDLE ASPIRATION  10/07/2021     Family History   Problem Relation Age of Onset    Leukemia Mother     Hypothyroidism Mother     Hypertension Mother     Osteoporosis Mother     Emphysema Father         smoker    Prostate cancer Father     Breast cancer Sister         bilateral at age 55; genetic testing negative BRCA    Colon cancer Maternal Grandfather         age 86    Leukemia Niece     No Known Problems Daughter     No Known Problems Daughter     Melanoma Daughter     reports that she quit smoking about 29 years ago. Her smoking use included cigarettes. She has never used smokeless tobacco. She reports current alcohol use. She reports that she does not use drugs.    OBJECTIVE    Vital Signs:   /70   Pulse 74   Temp (!) 100.8 °F (38.2 °C)   Ht 166.4 cm (65.51\")   Wt 52.2 kg (115 lb)   SpO2 98%   BMI 18.84 kg/m²     Physical Exam  Constitutional:       Appearance: Normal appearance. She is normal weight.   HENT:      Right Ear: Tympanic membrane is bulging.      Left Ear: Tympanic membrane is bulging.      Mouth/Throat:      Mouth: Mucous membranes are moist.      Pharynx: Posterior oropharyngeal erythema present. No oropharyngeal exudate.   Cardiovascular:      Rate and Rhythm: Normal rate and regular rhythm.      Heart sounds: Normal heart sounds. No murmur heard.  Pulmonary:      Effort: Pulmonary effort is normal.      Breath sounds: Normal breath sounds.   Skin:     General: Skin is warm and dry.   Neurological:     "  Mental Status: She is alert.   Psychiatric:         Mood and Affect: Mood normal.         Behavior: Behavior normal.         Thought Content: Thought content normal.            The following data was reviewed by: Mark Brown MD on 12/12/2023:            Data reviewed : recent PCP note              ASSESSMENT & PLAN     Diagnoses and all orders for this visit:    1. Viral URI (Primary)    Discussed diagnosis and treatment of URI.  Suggested symptomatic OTC remedies.  Tylenol for fever and body aches  Patient counseled to seek medical care for new or worsening symptoms or failure of symptoms to improve.           Health Maintenance Due   Topic Date Due    COVID-19 Vaccine (5 - 2023-24 season) 09/01/2023    ANNUAL WELLNESS VISIT  12/06/2023        Follow Up  No follow-ups on file.    Patient/family had no further questions at this time and verbalized understanding of the plan discussed today.

## 2023-12-21 DIAGNOSIS — M81.0 OSTEOPOROSIS OF LUMBAR SPINE: Primary | ICD-10-CM

## 2023-12-21 DIAGNOSIS — Z13.9 SCREENING FOR UNSPECIFIED CONDITION: ICD-10-CM

## 2023-12-22 LAB
25(OH)D3+25(OH)D2 SERPL-MCNC: 46.9 NG/ML (ref 30–100)
ALBUMIN SERPL-MCNC: 4.3 G/DL (ref 3.5–5.2)
ALBUMIN/GLOB SERPL: 1.8 G/DL
ALP SERPL-CCNC: 59 U/L (ref 39–117)
ALT SERPL-CCNC: 17 U/L (ref 1–33)
APPEARANCE UR: CLEAR
AST SERPL-CCNC: 23 U/L (ref 1–32)
BACTERIA #/AREA URNS HPF: NORMAL /HPF
BASOPHILS # BLD AUTO: 0.02 10*3/MM3 (ref 0–0.2)
BASOPHILS NFR BLD AUTO: 0.4 % (ref 0–1.5)
BILIRUB SERPL-MCNC: 0.5 MG/DL (ref 0–1.2)
BILIRUB UR QL STRIP: NEGATIVE
BUN SERPL-MCNC: 11 MG/DL (ref 8–23)
BUN/CREAT SERPL: 13.6 (ref 7–25)
CALCIUM SERPL-MCNC: 9.3 MG/DL (ref 8.6–10.5)
CASTS URNS QL MICRO: NORMAL /LPF
CHLORIDE SERPL-SCNC: 102 MMOL/L (ref 98–107)
CHOLEST SERPL-MCNC: 225 MG/DL (ref 0–200)
CO2 SERPL-SCNC: 26.8 MMOL/L (ref 22–29)
COLOR UR: YELLOW
CREAT SERPL-MCNC: 0.81 MG/DL (ref 0.57–1)
EGFRCR SERPLBLD CKD-EPI 2021: 75.3 ML/MIN/1.73
EOSINOPHIL # BLD AUTO: 0.12 10*3/MM3 (ref 0–0.4)
EOSINOPHIL NFR BLD AUTO: 2.2 % (ref 0.3–6.2)
EPI CELLS #/AREA URNS HPF: NORMAL /HPF (ref 0–10)
ERYTHROCYTE [DISTWIDTH] IN BLOOD BY AUTOMATED COUNT: 11.6 % (ref 12.3–15.4)
GLOBULIN SER CALC-MCNC: 2.4 GM/DL
GLUCOSE SERPL-MCNC: 95 MG/DL (ref 65–99)
GLUCOSE UR QL STRIP: NEGATIVE
HCT VFR BLD AUTO: 40.1 % (ref 34–46.6)
HDLC SERPL-MCNC: 67 MG/DL (ref 40–60)
HGB BLD-MCNC: 13.1 G/DL (ref 12–15.9)
HGB UR QL STRIP: NEGATIVE
IMM GRANULOCYTES # BLD AUTO: 0.02 10*3/MM3 (ref 0–0.05)
IMM GRANULOCYTES NFR BLD AUTO: 0.4 % (ref 0–0.5)
KETONES UR QL STRIP: NEGATIVE
LDLC SERPL CALC-MCNC: 145 MG/DL (ref 0–100)
LEUKOCYTE ESTERASE UR QL STRIP: NEGATIVE
LYMPHOCYTES # BLD AUTO: 1.47 10*3/MM3 (ref 0.7–3.1)
LYMPHOCYTES NFR BLD AUTO: 26.4 % (ref 19.6–45.3)
MCH RBC QN AUTO: 29.7 PG (ref 26.6–33)
MCHC RBC AUTO-ENTMCNC: 32.7 G/DL (ref 31.5–35.7)
MCV RBC AUTO: 90.9 FL (ref 79–97)
MICRO URNS: NORMAL
MICRO URNS: NORMAL
MONOCYTES # BLD AUTO: 0.6 10*3/MM3 (ref 0.1–0.9)
MONOCYTES NFR BLD AUTO: 10.8 % (ref 5–12)
NEUTROPHILS # BLD AUTO: 3.34 10*3/MM3 (ref 1.7–7)
NEUTROPHILS NFR BLD AUTO: 59.8 % (ref 42.7–76)
NITRITE UR QL STRIP: NEGATIVE
NRBC BLD AUTO-RTO: 0 /100 WBC (ref 0–0.2)
PH UR STRIP: 7.5 [PH] (ref 5–7.5)
PLATELET # BLD AUTO: 224 10*3/MM3 (ref 140–450)
POTASSIUM SERPL-SCNC: 4.1 MMOL/L (ref 3.5–5.2)
PROT SERPL-MCNC: 6.7 G/DL (ref 6–8.5)
PROT UR QL STRIP: NEGATIVE
RBC # BLD AUTO: 4.41 10*6/MM3 (ref 3.77–5.28)
RBC #/AREA URNS HPF: NORMAL /HPF (ref 0–2)
SODIUM SERPL-SCNC: 139 MMOL/L (ref 136–145)
SP GR UR STRIP: 1 (ref 1–1.03)
TRIGL SERPL-MCNC: 74 MG/DL (ref 0–150)
TSH SERPL DL<=0.005 MIU/L-ACNC: 2.19 UIU/ML (ref 0.27–4.2)
URINALYSIS REFLEX: NORMAL
UROBILINOGEN UR STRIP-MCNC: 0.2 MG/DL (ref 0.2–1)
VLDLC SERPL CALC-MCNC: 13 MG/DL (ref 5–40)
WBC # BLD AUTO: 5.57 10*3/MM3 (ref 3.4–10.8)
WBC #/AREA URNS HPF: NORMAL /HPF (ref 0–5)

## 2023-12-26 ENCOUNTER — OFFICE VISIT (OUTPATIENT)
Dept: INTERNAL MEDICINE | Facility: CLINIC | Age: 78
End: 2023-12-26
Payer: MEDICARE

## 2023-12-26 VITALS
WEIGHT: 114.1 LBS | TEMPERATURE: 97.4 F | OXYGEN SATURATION: 98 % | BODY MASS INDEX: 18.34 KG/M2 | HEART RATE: 75 BPM | HEIGHT: 66 IN | SYSTOLIC BLOOD PRESSURE: 136 MMHG | DIASTOLIC BLOOD PRESSURE: 70 MMHG

## 2023-12-26 DIAGNOSIS — H81.10 BPPV (BENIGN PAROXYSMAL POSITIONAL VERTIGO), UNSPECIFIED LATERALITY: ICD-10-CM

## 2023-12-26 DIAGNOSIS — D12.6 ADENOMATOUS POLYP OF COLON, UNSPECIFIED PART OF COLON: ICD-10-CM

## 2023-12-26 DIAGNOSIS — I83.92 ASYMPTOMATIC VARICOSE VEINS OF LEFT LOWER EXTREMITY: ICD-10-CM

## 2023-12-26 DIAGNOSIS — Z00.00 ENCOUNTER FOR SUBSEQUENT ANNUAL WELLNESS VISIT (AWV) IN MEDICARE PATIENT: Primary | ICD-10-CM

## 2023-12-26 DIAGNOSIS — M85.859 OSTEOPENIA OF HIP, UNSPECIFIED LATERALITY: ICD-10-CM

## 2023-12-26 DIAGNOSIS — M81.0 OSTEOPOROSIS OF LUMBAR SPINE: ICD-10-CM

## 2023-12-26 DIAGNOSIS — H91.93 BILATERAL HEARING LOSS, UNSPECIFIED HEARING LOSS TYPE: ICD-10-CM

## 2023-12-26 DIAGNOSIS — Z00.01 ENCOUNTER FOR GENERAL ADULT MEDICAL EXAMINATION WITH ABNORMAL FINDINGS: ICD-10-CM

## 2023-12-26 DIAGNOSIS — R43.0 ANOSMIA: ICD-10-CM

## 2023-12-26 DIAGNOSIS — E04.2 MULTIPLE THYROID NODULES: ICD-10-CM

## 2023-12-26 RX ORDER — MAGNESIUM OXIDE 400 MG/1
400 TABLET ORAL DAILY
COMMUNITY

## 2023-12-26 NOTE — PROGRESS NOTES
Subjective   Lani Lopez is a 78 y.o. female who presents for a Medicare Well Visit    Patient Care Team:  Virgilio Rivas MD as PCP - General (Internal Medicine)    Recent Hospitalizations: No recent hospitalization(s).    Depression Screen:       2023     3:00 PM   PHQ-2/PHQ-9 Depression Screening   Little Interest or Pleasure in Doing Things 0-->not at all   Feeling Down, Depressed or Hopeless 0-->not at all   PHQ-9: Brief Depression Severity Measure Score 0       Functional and Cognitive Screenin/26/2023     3:00 PM   Functional & Cognitive Status   Do you have difficulty preparing food and eating? No   Do you have difficulty bathing yourself, getting dressed or grooming yourself? No   Do you have difficulty using the toilet? No   Do you have difficulty moving around from place to place? No   Do you have trouble with steps or getting out of a bed or a chair? No   Current Diet Well Balanced Diet   Dental Exam Up to date   Eye Exam Up to date   Exercise (times per week) 7 times per week   Current Exercises Include Tennis;Walking   Do you need help using the phone?  No   Are you deaf or do you have serious difficulty hearing?  No   Do you need help to go to places out of walking distance? No   Do you need help shopping? No   Do you need help preparing meals?  No   Do you need help with housework?  No   Do you need help with laundry? No   Do you need help taking your medications? No   Do you need help managing money? No   Do you ever drive or ride in a car without wearing a seat belt? No   Have you felt unusual stress, anger or loneliness in the last month? No   Who do you live with? Spouse   If you need help, do you have trouble finding someone available to you? No   Have you been bothered in the last four weeks by sexual problems? No   Do you have difficulty concentrating, remembering or making decisions? No       Does the patient have evidence of cognitive impairment? no    No opioid  "medication identified on active medication list. I have reviewed chart for other potential  high risk medication/s and harmful drug interactions in the elderly.          Does not need ASA (and currently is not on it)    Vitals:    12/26/23 1538   BP: 136/70   BP Location: Left arm   Patient Position: Sitting   Cuff Size: Adult   Pulse: 75   Temp: 97.4 °F (36.3 °C)   TempSrc: Infrared   SpO2: 98%   Weight: 51.8 kg (114 lb 1.6 oz)   Height: 166.4 cm (65.51\")       Body mass index is 18.69 kg/m².    Visual Acuity:  No results found.    Advanced Care Planning:  ACP discussion was held with the patient during this visit. Patient has an advance directive (not in EMR), copy requested.    Compared to one year ago, the patient feels physical health is the same.  Compared to one year ago, the patient feels mental health is the same.    Reviewed chart for potential of high risk medication in the elderly: yes  Reviewed chart for potential of harmful drug interactions in the elderly:yes    Identification of Risk Factors:  Risk factors include: Advance Directive Discussion  Breast Cancer/Mammogram Screening  Colon Cancer Screening  Diabetic Lab Screening   Glaucoma Risk  Hearing Problem  Immunizations Discussed/Encouraged (specific immunizations; Tdap, Influenza, Pneumococcal 23, Shingrix, COVID19, and RSV (Respiratory Syncytial Virus) ).    Patient Self-Management and Personalized Health Advice  The patient has been provided with information about: diet and exercise and preventive services including:   Annual Wellness Visit (AWV)  Bone Density Measurements  Colorectal Cancer Screening, Colonoscopy  Screening Mammography .  Follow Up: Medicare visit in one year    Discussed the patient's BMI with her. The BMI is in the acceptable range.    Patient has multiple medical problems which are significant and separately identifiable that require additional work above and beyond the Medicare Wellness Visit. These are not trivial or " "insignificant and are billed with a 25-modifier    Chief Complaint   Patient presents with    Medicare Wellness-subsequent     Review of medical issues       HPI:  Lnai Lopez is a 78 y.o. female RTC In yearly CPE/ AWV, review of medical issues: 'My health has been fine'.  Thinks had COVID early in 12/2023,  had (+) test. Lost taste and smell since then and not come back at all, 'no, it hasn't'.  Could not taste any food at [employer].   1. Osteoporosis, 6/2022 DEXA showed progression of bone density loss, severe osteoporosis in lumbar spine with T score -3.9 - on Fosamax, good tolerance. No issues.  Is still playing tennis 3x/ week and getting 10K steps daily. On Ca++ D daily and added magnsium  2. Thyroid nodule, 1.1cm, L lobe incidental note on CT chest, U/S 10/2021 with bx - Path was benign 10/2021. S/P ENT 4/2022 with US, stable.  Saw ENT 4/2023 and told no change, plans to see him in 4/2024.   3. Varicose Veins B, focal large varicosity L leg - asx'ic. No pain at all.   4. HM -did not have updated COVID shot; aware of RSV but not excited about it.    Review of Systems   Constitutional: Positive for malaise/fatigue (since had COVID in). Negative for chills, fever, weight gain and weight loss.   HENT:  Positive for congestion (since had URI (? COVID) 2 weeks ago) and hearing loss (notes some loss.  \"I dont know, I dont\".). Negative for ear discharge, ear pain, odynophagia and sore throat.    Eyes:  Negative for discharge, double vision, pain and redness.        Last eye exam 3/2023; no issues     Cardiovascular:  Negative for chest pain, dyspnea on exertion, irregular heartbeat, leg swelling, near-syncope, palpitations and syncope.   Respiratory:  Negative for cough and shortness of breath.    Endocrine: Negative for polydipsia, polyphagia and polyuria.   Hematologic/Lymphatic: Negative for bleeding problem. Does not bruise/bleed easily.   Skin:  Negative for rash.   Musculoskeletal:  Negative for " "back pain, joint pain and neck pain.        \"I dont have any pain anywhere\".      Gastrointestinal:  Negative for constipation, diarrhea, dysphagia, heartburn, nausea and vomiting.   Genitourinary:  Negative for dysuria, frequency, hematuria and hesitancy.   Neurological:  Positive for vertigo (when sits up from bed, seconds of vertigo, for months). Negative for dizziness, headaches and light-headedness.   Psychiatric/Behavioral:  Negative for altered mental status and depression. The patient does not have insomnia and is not nervous/anxious.    Allergic/Immunologic: Negative for persistent infections.     @OBJECTIVE BEGIN@  Vitals:    12/26/23 1538   BP: 136/70   Pulse: 75   Temp: 97.4 °F (36.3 °C)   SpO2: 98%     Physical Exam  Vitals reviewed. Exam conducted with a chaperone present.   Constitutional:       General: She is not in acute distress.     Appearance: Normal appearance. She is well-developed. She is not ill-appearing or toxic-appearing.   HENT:      Head: Normocephalic and atraumatic.      Right Ear: Tympanic membrane, ear canal and external ear normal. There is no impacted cerumen.      Left Ear: Tympanic membrane, ear canal and external ear normal. There is no impacted cerumen.      Nose: Nose normal.      Mouth/Throat:      Mouth: Mucous membranes are moist.      Pharynx: Oropharynx is clear. Uvula midline. No oropharyngeal exudate.   Eyes:      General: Lids are normal. No scleral icterus.     Extraocular Movements: Extraocular movements intact.      Conjunctiva/sclera: Conjunctivae normal.      Pupils: Pupils are equal, round, and reactive to light.   Neck:      Thyroid: No thyroid mass or thyromegaly.      Vascular: No carotid bruit.      Trachea: Trachea normal.   Cardiovascular:      Rate and Rhythm: Normal rate and regular rhythm.      Pulses:           Radial pulses are 2+ on the right side and 2+ on the left side.        Dorsalis pedis pulses are 2+ on the right side and 2+ on the left side. "        Posterior tibial pulses are 2+ on the right side and 2+ on the left side.      Heart sounds: Normal heart sounds, S1 normal and S2 normal. No murmur heard.     No friction rub. No gallop.      Comments: Diffuse scattered varicose and spider veins BLE  Pulmonary:      Effort: Pulmonary effort is normal. No respiratory distress.      Breath sounds: Normal breath sounds. No wheezing, rhonchi or rales.   Chest:      Chest wall: No mass.   Breasts:     Right: No inverted nipple, mass, nipple discharge or skin change.      Left: No inverted nipple, mass, nipple discharge or skin change.   Abdominal:      General: Bowel sounds are normal. There is no distension.      Palpations: Abdomen is soft. There is no mass.      Tenderness: There is no abdominal tenderness. There is no guarding or rebound.   Musculoskeletal:         General: Normal range of motion.      Right shoulder: No tenderness, bony tenderness or crepitus. Normal range of motion.      Left shoulder: No tenderness, bony tenderness or crepitus. Normal range of motion.      Right hand: No tenderness or bony tenderness. Normal range of motion. Normal strength.      Left hand: No tenderness or bony tenderness. Normal range of motion. Normal strength.      Cervical back: Full passive range of motion without pain, normal range of motion and neck supple.      Right lower leg: No edema.      Left lower leg: No edema.      Right foot: Normal range of motion. No deformity or bunion.      Left foot: Normal range of motion. No deformity or bunion.   Feet:      Right foot:      Skin integrity: No ulcer, blister or skin breakdown.      Toenail Condition: Right toenails are normal.      Left foot:      Skin integrity: No ulcer, blister or skin breakdown.      Toenail Condition: Left toenails are normal.   Lymphadenopathy:      Cervical: No cervical adenopathy.      Right cervical: No superficial, deep or posterior cervical adenopathy.     Left cervical: No superficial,  deep or posterior cervical adenopathy.      Upper Body:      Right upper body: No supraclavicular, axillary or pectoral adenopathy.      Left upper body: No supraclavicular, axillary or pectoral adenopathy.      Lower Body: No right inguinal adenopathy. No left inguinal adenopathy.   Skin:     General: Skin is warm and dry.      Findings: No rash.   Neurological:      Mental Status: She is alert and oriented to person, place, and time.      Cranial Nerves: No cranial nerve deficit, dysarthria or facial asymmetry.      Sensory: No sensory deficit.      Motor: No weakness, tremor, atrophy or abnormal muscle tone.      Gait: Gait normal.      Deep Tendon Reflexes: Reflexes are normal and symmetric.      Reflex Scores:       Patellar reflexes are 2+ on the right side and 2+ on the left side.       Achilles reflexes are 2+ on the right side and 2+ on the left side.     Comments: Ernesto Hallpike (-) B, but some issues with compliance during test     Psychiatric:         Mood and Affect: Mood normal.         Behavior: Behavior normal.         Thought Content: Thought content normal.           Assessment & Plan   Diagnoses and all orders for this visit:    1. Encounter for subsequent annual wellness visit (AWV) in Medicare patient (Primary)    2. Encounter for general adult medical examination with abnormal findings    3. Bilateral hearing loss, unspecified hearing loss type  -     Ambulatory Referral to Audiology    4. Asymptomatic varicose veins of left lower extremity    5. Osteopenia of hip, unspecified laterality  -     DEXA Bone Density Axial; Future    6. Multiple thyroid nodules    7. Osteoporosis of lumbar spine  -     DEXA Bone Density Axial; Future    8. Adenomatous polyp of colon, unspecified part of colon    9. Anosmia    10. BPPV (benign paroxysmal positional vertigo), unspecified laterality          Diagnoses and all orders for this visit:    Encounter for subsequent annual wellness visit (AWV) in Medicare  patient    Encounter for general adult medical examination with abnormal findings    Bilateral hearing loss, unspecified hearing loss type  -     Ambulatory Referral to Audiology    Asymptomatic varicose veins of left lower extremity    Osteopenia of hip, unspecified laterality  -     DEXA Bone Density Axial; Future    Multiple thyroid nodules    Osteoporosis of lumbar spine  -     DEXA Bone Density Axial; Future    Adenomatous polyp of colon, unspecified part of colon    Anosmia    BPPV (benign paroxysmal positional vertigo), unspecified laterality    Other orders  -     magnesium oxide (MAG-OX) 400 MG tablet; Take 1 tablet by mouth Daily.    Lani Lopez is a 78 y.o. female RTC In yearly CPE/ AWV, review of medical issues:  1. Anosmia - new issue in last few weeks, after URI. Suspected COVID with false (-) inoffice test due to  COVID (+) same time. Still some mild lingering sx, but anosmia main issue. Counseled and handout on smell rehab provided to pt today.   2. Osteoporosis, 6/2022 DEXA showed progression of bone density loss, severe osteoporosis in lumbar spine with T score -3.9 - on Fosamax, good tolerance.  Recall ~2-3 years of Fosamax in past. C/W Ca++D. Remain active daily with weight bearing exercise. Recheck DEXA 6/2024.    3. Colon polyps, 11/2022 with 2 polyps, TA's - f/u 5 years C-scope.   4. Thyroid nodule, 1.1cm, L lobe incidental note on CT chest, U/S 10/2021 with bx - Path benign 10/2021. S/P ENT 4/2022 and 4/2023 with US, stable.  F/U ENT 4/2024.   --> Hearing loss - new mention today, background noise main issue. Refer to ENT audiologist for eval.   5. Varicose Veins B, focal large varicosity L leg - asx'ic.  Declined vein clinic eval.  6. Hx of psoriasis - noted in distant past, no recurrence. F/U derm as planned.   7. Vertigo - incidental note on exam, sx onset sitting up from lying, offset ~3 seconds. Ernesto Hallpike (-) B, but some compliance with formal test.  Suspect BPPV, [facility]  handout provided.   8. HM - labs d/w pt; Flu/ Tdap/ Prevnar/ Pvax/ Shingrix/ COVID - UTD;  COVID updated booster advised; Hep A declined;  RSV vacccine reviewed with pt, d/w pt rationale and risk vs. benefit, reviewed trial data including GB syndrome and Afib incidence, pt defers for now; C-scope 11/2022 with 2 polyps, TA's --> 5 years; Mammo (-) 11/2023, breast exam OK today; Pap D/C s/p KALANI-BSO for benign cyst; DEXA due 6/2024; Hep C Ab (-) 9/2020; c/w TLC diet and exercise as she is; Preventative care plan provided to pt at end of visit    No follow-ups on file.          Current Outpatient Medications:     alendronate (FOSAMAX) 70 MG tablet, TAKE 1 TABLET EVERY 7 DAYS, Disp: 12 tablet, Rfl: 3    calcium carbonate (OS-MARC) 600 MG tablet, Take 1 tablet by mouth Daily., Disp: , Rfl:     Multiple Vitamins-Minerals (ZINC PO), Take 1 tablet by mouth Daily., Disp: , Rfl:     Omega-3 Fatty Acids (FISH OIL) 1000 MG capsule capsule, Take  by mouth Daily With Breakfast., Disp: , Rfl:     VITAMIN D PO, Take 1 tablet by mouth Daily., Disp: , Rfl:     magnesium oxide (MAG-OX) 400 MG tablet, Take 1 tablet by mouth Daily., Disp: , Rfl:

## 2023-12-26 NOTE — PATIENT INSTRUCTIONS
Medicare Wellness  Personal Prevention Plan of Service     Date of Office Visit:    Encounter Provider:  Virgilio Rivas MD  Place of Service:  [facility]  Patient Name: Lani Lopez  :  1945    As part of the Medicare Wellness portion of your visit today, we are providing you with this personalized preventive plan of services (PPPS). This plan is based upon recommendations of the [place] Preventive Services Task Force (USPSTF) and the Advisory Committee on Immunization Practices (ACIP).    This lists the preventive care services that should be considered, and provides dates of when you are due. Items listed as completed are up-to-date and do not require any further intervention.    Health Maintenance   Topic Date Due    COVID-19 Vaccine (2023- season) 2023    DXA SCAN  06/15/2024    ANNUAL WELLNESS VISIT  2024    TDAP/TD VACCINES (2 - Td or Tdap) 2027    COLORECTAL CANCER SCREENING  2032    HEPATITIS C SCREENING  Completed    INFLUENZA VACCINE  Completed    Pneumococcal Vaccine 65+  Completed    ZOSTER VACCINE  Completed       Orders Placed This Encounter   Procedures    DEXA Bone Density Axial     Standing Status:   Future     Standing Expiration Date:   2024     Order Specific Question:   Is patient taking or have taken long term Glucocorticoid (steroids)?     Answer:   No     Order Specific Question:   Does the patient have rheumatoid arthritis?     Answer:   No     Order Specific Question:   Does the patient have secondary osteoporosis?     Answer:   No     Order Specific Question:   Reason for Exam:     Answer:   osteoporosis     Order Specific Question:   Release to patient     Answer:   Routine Release [[id number]]    Ambulatory Referral to Audiology     Referral Priority:   Routine     Referral Type:   Consultation     Referral Reason:   Specialty Services Required     Requested Specialty:   Audiology     Number of Visits  Requested:   1       Return in about 1 year (around 12/26/2024) for Medicare Wellness, Annual physical.

## 2024-02-08 ENCOUNTER — TRANSCRIBE ORDERS (OUTPATIENT)
Dept: ADMINISTRATIVE | Facility: HOSPITAL | Age: 79
End: 2024-02-08
Payer: MEDICARE

## 2024-02-08 DIAGNOSIS — E04.1 NONTOXIC THYROID NODULE: Primary | ICD-10-CM

## 2024-03-13 ENCOUNTER — HOSPITAL ENCOUNTER (OUTPATIENT)
Dept: ULTRASOUND IMAGING | Facility: HOSPITAL | Age: 79
Discharge: HOME | End: 2024-03-13
Admitting: OTOLARYNGOLOGY
Payer: MEDICARE

## 2024-03-13 DIAGNOSIS — E04.1 NONTOXIC THYROID NODULE: ICD-10-CM

## 2024-03-13 PROCEDURE — 76536 US EXAM OF HEAD AND NECK: CPT

## 2024-07-05 ENCOUNTER — HOSPITAL ENCOUNTER (OUTPATIENT)
Dept: BONE DENSITY | Facility: HOSPITAL | Age: 79
Discharge: HOME | End: 2024-07-05
Payer: MEDICARE

## 2024-07-05 DIAGNOSIS — M81.0 OSTEOPOROSIS OF LUMBAR SPINE: ICD-10-CM

## 2024-07-05 DIAGNOSIS — M85.859 OSTEOPENIA OF HIP, UNSPECIFIED LATERALITY: ICD-10-CM

## 2024-07-05 PROCEDURE — 77080 DXA BONE DENSITY AXIAL: CPT

## 2024-09-16 ENCOUNTER — HOSPITAL ENCOUNTER (OUTPATIENT)
Facility: HOSPITAL | Age: 79
Discharge: HOME | End: 2024-09-16
Admitting: INTERNAL MEDICINE
Payer: MEDICARE

## 2024-09-16 ENCOUNTER — OFFICE VISIT (OUTPATIENT)
Dept: INTERNAL MEDICINE | Facility: CLINIC | Age: 79
End: 2024-09-16
Payer: MEDICARE

## 2024-09-16 VITALS
HEIGHT: 66 IN | TEMPERATURE: 97.5 F | SYSTOLIC BLOOD PRESSURE: 138 MMHG | DIASTOLIC BLOOD PRESSURE: 70 MMHG | BODY MASS INDEX: 17.18 KG/M2 | WEIGHT: 106.9 LBS | HEART RATE: 80 BPM | OXYGEN SATURATION: 100 %

## 2024-09-16 DIAGNOSIS — R93.89 ABNORMAL CHEST X-RAY: ICD-10-CM

## 2024-09-16 DIAGNOSIS — R04.2 HEMOPTYSIS: ICD-10-CM

## 2024-09-16 DIAGNOSIS — R04.2 HEMOPTYSIS: Primary | ICD-10-CM

## 2024-09-16 PROCEDURE — 1160F RVW MEDS BY RX/DR IN RCRD: CPT | Performed by: INTERNAL MEDICINE

## 2024-09-16 PROCEDURE — 1125F AMNT PAIN NOTED PAIN PRSNT: CPT | Performed by: INTERNAL MEDICINE

## 2024-09-16 PROCEDURE — 99214 OFFICE O/P EST MOD 30 MIN: CPT | Performed by: INTERNAL MEDICINE

## 2024-09-16 PROCEDURE — 1159F MED LIST DOCD IN RCRD: CPT | Performed by: INTERNAL MEDICINE

## 2024-09-16 PROCEDURE — 71260 CT THORAX DX C+: CPT

## 2024-09-16 PROCEDURE — 25510000001 IOPAMIDOL 61 % SOLUTION: Performed by: INTERNAL MEDICINE

## 2024-09-16 RX ORDER — IOPAMIDOL 612 MG/ML
100 INJECTION, SOLUTION INTRAVASCULAR
Status: COMPLETED | OUTPATIENT
Start: 2024-09-16 | End: 2024-09-16

## 2024-09-16 RX ADMIN — IOPAMIDOL 75 ML: 612 INJECTION, SOLUTION INTRAVENOUS at 14:54

## 2024-09-17 DIAGNOSIS — R93.89 ABNORMAL CHEST CT: Primary | ICD-10-CM

## 2024-09-17 DIAGNOSIS — R04.2 HEMOPTYSIS: ICD-10-CM

## 2024-09-18 ENCOUNTER — PATIENT MESSAGE (OUTPATIENT)
Dept: INTERNAL MEDICINE | Facility: CLINIC | Age: 79
End: 2024-09-18
Payer: MEDICARE

## 2024-09-23 ENCOUNTER — HOSPITAL ENCOUNTER (OUTPATIENT)
Facility: HOSPITAL | Age: 79
Setting detail: HOSPITAL OUTPATIENT SURGERY
Discharge: HOME | End: 2024-09-23
Attending: STUDENT IN AN ORGANIZED HEALTH CARE EDUCATION/TRAINING PROGRAM | Admitting: STUDENT IN AN ORGANIZED HEALTH CARE EDUCATION/TRAINING PROGRAM
Payer: MEDICARE

## 2024-09-23 ENCOUNTER — ANESTHESIA EVENT (OUTPATIENT)
Dept: GASTROENTEROLOGY | Facility: HOSPITAL | Age: 79
End: 2024-09-23
Payer: MEDICARE

## 2024-09-23 ENCOUNTER — ANESTHESIA (OUTPATIENT)
Dept: GASTROENTEROLOGY | Facility: HOSPITAL | Age: 79
End: 2024-09-23
Payer: MEDICARE

## 2024-09-23 VITALS
HEIGHT: 65 IN | OXYGEN SATURATION: 95 % | WEIGHT: 106.7 LBS | SYSTOLIC BLOOD PRESSURE: 120 MMHG | HEART RATE: 60 BPM | BODY MASS INDEX: 17.78 KG/M2 | DIASTOLIC BLOOD PRESSURE: 73 MMHG | RESPIRATION RATE: 16 BRPM

## 2024-09-23 DIAGNOSIS — J47.9 BRONCHIECTASIS: ICD-10-CM

## 2024-09-23 LAB
APPEARANCE FLD: ABNORMAL
APPEARANCE FLD: ABNORMAL
B PARAPERT DNA SPEC QL NAA+PROBE: NOT DETECTED
B PERT DNA SPEC QL NAA+PROBE: NOT DETECTED
C PNEUM DNA NPH QL NAA+NON-PROBE: NOT DETECTED
COLOR FLD: COLORLESS
COLOR FLD: COLORLESS
FLUAV SUBTYP SPEC NAA+PROBE: NOT DETECTED
FLUBV RNA ISLT QL NAA+PROBE: NOT DETECTED
HADV DNA SPEC NAA+PROBE: NOT DETECTED
HCOV 229E RNA SPEC QL NAA+PROBE: NOT DETECTED
HCOV HKU1 RNA SPEC QL NAA+PROBE: NOT DETECTED
HCOV NL63 RNA SPEC QL NAA+PROBE: NOT DETECTED
HCOV OC43 RNA SPEC QL NAA+PROBE: NOT DETECTED
HMPV RNA NPH QL NAA+NON-PROBE: NOT DETECTED
HPIV1 RNA ISLT QL NAA+PROBE: NOT DETECTED
HPIV2 RNA SPEC QL NAA+PROBE: NOT DETECTED
HPIV3 RNA NPH QL NAA+PROBE: NOT DETECTED
HPIV4 P GENE NPH QL NAA+PROBE: NOT DETECTED
LYMPHOCYTES NFR FLD MANUAL: 11 %
LYMPHOCYTES NFR FLD MANUAL: 4 %
M PNEUMO IGG SER IA-ACNC: NOT DETECTED
METHOD: ABNORMAL
METHOD: ABNORMAL
MONOCYTES NFR FLD: 2 %
MONOCYTES NFR FLD: 2 %
MONOS+MACROS NFR FLD: 14 %
MONOS+MACROS NFR FLD: 21 %
NEUTROPHILS NFR FLD MANUAL: 73 %
NEUTROPHILS NFR FLD MANUAL: 73 %
NUC CELL # FLD: 245 /MM3
NUC CELL # FLD: 343 /MM3
RBC # FLD AUTO: 24 /MM3
RBC # FLD AUTO: 32 /MM3
RHINOVIRUS RNA SPEC NAA+PROBE: NOT DETECTED
RSV RNA NPH QL NAA+NON-PROBE: NOT DETECTED
SARS-COV-2 RNA NPH QL NAA+NON-PROBE: NOT DETECTED

## 2024-09-23 PROCEDURE — 0202U NFCT DS 22 TRGT SARS-COV-2: CPT | Performed by: STUDENT IN AN ORGANIZED HEALTH CARE EDUCATION/TRAINING PROGRAM

## 2024-09-23 PROCEDURE — 87206 SMEAR FLUORESCENT/ACID STAI: CPT | Performed by: STUDENT IN AN ORGANIZED HEALTH CARE EDUCATION/TRAINING PROGRAM

## 2024-09-23 PROCEDURE — 87116 MYCOBACTERIA CULTURE: CPT | Performed by: STUDENT IN AN ORGANIZED HEALTH CARE EDUCATION/TRAINING PROGRAM

## 2024-09-23 PROCEDURE — 88112 CYTOPATH CELL ENHANCE TECH: CPT | Performed by: STUDENT IN AN ORGANIZED HEALTH CARE EDUCATION/TRAINING PROGRAM

## 2024-09-23 PROCEDURE — 87205 SMEAR GRAM STAIN: CPT | Performed by: STUDENT IN AN ORGANIZED HEALTH CARE EDUCATION/TRAINING PROGRAM

## 2024-09-23 PROCEDURE — 89051 BODY FLUID CELL COUNT: CPT | Performed by: STUDENT IN AN ORGANIZED HEALTH CARE EDUCATION/TRAINING PROGRAM

## 2024-09-23 PROCEDURE — 87102 FUNGUS ISOLATION CULTURE: CPT | Performed by: STUDENT IN AN ORGANIZED HEALTH CARE EDUCATION/TRAINING PROGRAM

## 2024-09-23 PROCEDURE — 87071 CULTURE AEROBIC QUANT OTHER: CPT | Performed by: STUDENT IN AN ORGANIZED HEALTH CARE EDUCATION/TRAINING PROGRAM

## 2024-09-23 PROCEDURE — 25810000003 LACTATED RINGERS PER 1000 ML: Performed by: STUDENT IN AN ORGANIZED HEALTH CARE EDUCATION/TRAINING PROGRAM

## 2024-09-23 PROCEDURE — 25010000002 PROPOFOL 10 MG/ML EMULSION: Performed by: NURSE ANESTHETIST, CERTIFIED REGISTERED

## 2024-09-23 RX ORDER — PROPOFOL 10 MG/ML
VIAL (ML) INTRAVENOUS AS NEEDED
Status: DISCONTINUED | OUTPATIENT
Start: 2024-09-23 | End: 2024-09-23 | Stop reason: SURG

## 2024-09-23 RX ORDER — LIDOCAINE HYDROCHLORIDE 20 MG/ML
INJECTION, SOLUTION INFILTRATION; PERINEURAL AS NEEDED
Status: DISCONTINUED | OUTPATIENT
Start: 2024-09-23 | End: 2024-09-23 | Stop reason: SURG

## 2024-09-23 RX ORDER — SODIUM CHLORIDE 0.9 % (FLUSH) 0.9 %
10 SYRINGE (ML) INJECTION EVERY 12 HOURS SCHEDULED
Status: DISCONTINUED | OUTPATIENT
Start: 2024-09-23 | End: 2024-09-23 | Stop reason: HOSPADM

## 2024-09-23 RX ORDER — SODIUM CHLORIDE 9 MG/ML
40 INJECTION, SOLUTION INTRAVENOUS AS NEEDED
Status: DISCONTINUED | OUTPATIENT
Start: 2024-09-23 | End: 2024-09-23 | Stop reason: HOSPADM

## 2024-09-23 RX ORDER — LIDOCAINE HYDROCHLORIDE 10 MG/ML
INJECTION, SOLUTION EPIDURAL; INFILTRATION; INTRACAUDAL; PERINEURAL AS NEEDED
Status: DISCONTINUED | OUTPATIENT
Start: 2024-09-23 | End: 2024-09-23 | Stop reason: HOSPADM

## 2024-09-23 RX ORDER — SODIUM CHLORIDE, SODIUM LACTATE, POTASSIUM CHLORIDE, CALCIUM CHLORIDE 600; 310; 30; 20 MG/100ML; MG/100ML; MG/100ML; MG/100ML
30 INJECTION, SOLUTION INTRAVENOUS CONTINUOUS PRN
Status: DISCONTINUED | OUTPATIENT
Start: 2024-09-23 | End: 2024-09-23 | Stop reason: HOSPADM

## 2024-09-23 RX ORDER — SODIUM CHLORIDE 0.9 % (FLUSH) 0.9 %
10 SYRINGE (ML) INJECTION AS NEEDED
Status: DISCONTINUED | OUTPATIENT
Start: 2024-09-23 | End: 2024-09-23 | Stop reason: HOSPADM

## 2024-09-23 RX ADMIN — SODIUM CHLORIDE, POTASSIUM CHLORIDE, SODIUM LACTATE AND CALCIUM CHLORIDE 30 ML/HR: 600; 310; 30; 20 INJECTION, SOLUTION INTRAVENOUS at 08:41

## 2024-09-23 RX ADMIN — PROPOFOL 50 MG: 10 INJECTION, EMULSION INTRAVENOUS at 09:16

## 2024-09-23 RX ADMIN — PROPOFOL 100 MG: 10 INJECTION, EMULSION INTRAVENOUS at 09:14

## 2024-09-23 RX ADMIN — LIDOCAINE HYDROCHLORIDE 50 MG: 20 INJECTION, SOLUTION INFILTRATION; PERINEURAL at 09:08

## 2024-09-23 RX ADMIN — LIDOCAINE HYDROCHLORIDE 50 MG: 20 INJECTION, SOLUTION INFILTRATION; PERINEURAL at 09:14

## 2024-09-23 RX ADMIN — PROPOFOL 200 MG: 10 INJECTION, EMULSION INTRAVENOUS at 09:08

## 2024-09-24 LAB
CYTO UR: NORMAL
LAB AP CASE REPORT: NORMAL
LAB AP CLINICAL INFORMATION: NORMAL
PATH REPORT.FINAL DX SPEC: NORMAL
PATH REPORT.GROSS SPEC: NORMAL

## 2024-09-25 LAB
BACTERIA SPEC AEROBE CULT: NO GROWTH
BACTERIA SPEC AEROBE CULT: NO GROWTH
GRAM STN SPEC: NORMAL

## 2024-09-27 LAB
FUNGUS WND CULT: ABNORMAL
FUNGUS WND CULT: ABNORMAL

## 2024-10-07 LAB
FUNGUS WND CULT: ABNORMAL
FUNGUS WND CULT: ABNORMAL

## 2024-10-11 ENCOUNTER — OFFICE VISIT (OUTPATIENT)
Dept: INTERNAL MEDICINE | Facility: CLINIC | Age: 79
End: 2024-10-11
Payer: MEDICARE

## 2024-10-11 VITALS
DIASTOLIC BLOOD PRESSURE: 64 MMHG | BODY MASS INDEX: 17 KG/M2 | OXYGEN SATURATION: 99 % | WEIGHT: 102 LBS | HEIGHT: 65 IN | SYSTOLIC BLOOD PRESSURE: 112 MMHG | HEART RATE: 60 BPM | TEMPERATURE: 97.5 F

## 2024-10-11 DIAGNOSIS — Z87.898 HISTORY OF HEMOPTYSIS: ICD-10-CM

## 2024-10-11 DIAGNOSIS — R93.89 ABNORMAL CT OF THE CHEST: ICD-10-CM

## 2024-10-11 DIAGNOSIS — J47.9 BRONCHIECTASIS WITHOUT COMPLICATION: Primary | ICD-10-CM

## 2024-10-11 PROCEDURE — 1159F MED LIST DOCD IN RCRD: CPT | Performed by: INTERNAL MEDICINE

## 2024-10-11 PROCEDURE — 1160F RVW MEDS BY RX/DR IN RCRD: CPT | Performed by: INTERNAL MEDICINE

## 2024-10-11 PROCEDURE — 99213 OFFICE O/P EST LOW 20 MIN: CPT | Performed by: INTERNAL MEDICINE

## 2024-10-11 PROCEDURE — 1125F AMNT PAIN NOTED PAIN PRSNT: CPT | Performed by: INTERNAL MEDICINE

## 2024-10-11 RX ORDER — LATANOPROST 50 UG/ML
SOLUTION/ DROPS OPHTHALMIC
COMMUNITY
Start: 2024-10-07

## 2024-10-11 NOTE — PROGRESS NOTES
"Follow-up with Hemoptysis      HPI  Lani Lopez is a 79 y.o. female RTC in short interval f/u after recent hemoptysis.  Last time was ~5 weeks ago.  Has not had any issues since had bronch. Did see pulmonary last week and no growth on culture yet.  Told will get a call with anything changes on the culture.  Patient overall feeling well and feels in usual state of health.  No cough or hemoptysis at this time.  Wonders if safe to travel to [place].  Wonders if needs to get routine viral vaccines (flu and COVID)  Admits very stressed over this whole situation and is \"trying to not be stressed\".  Has done some reading on [employer] online and has some understanding of issue    Review of Systems   Constitutional: Negative for chills and fever.   Cardiovascular:  Negative for dyspnea on exertion.   Respiratory:  Negative for cough, hemoptysis (no recurrence) and shortness of breath.        The following portions of the patient's history were reviewed and updated as appropriate: allergies, current medications, past medical history, past social history, and problem list.      Current Outpatient Medications:     alendronate (FOSAMAX) 70 MG tablet, TAKE 1 TABLET EVERY 7 DAYS, Disp: 12 tablet, Rfl: 3    calcium carbonate (OS-MARC) 600 MG tablet, Take 1 tablet by mouth Daily., Disp: , Rfl:     latanoprost (XALATAN) 0.005 % ophthalmic solution, , Disp: , Rfl:     magnesium oxide (MAG-OX) 400 MG tablet, Take 1 tablet by mouth Daily., Disp: , Rfl:     Multiple Vitamins-Minerals (ZINC PO), Take 1 tablet by mouth Daily., Disp: , Rfl:     VITAMIN D PO, Take 1 tablet by mouth Daily., Disp: , Rfl:     Vitals:    10/11/24 0936   BP: 112/64   BP Location: Left arm   Patient Position: Sitting   Cuff Size: Adult   Pulse: 60   Temp: 97.5 °F (36.4 °C)   TempSrc: Infrared   SpO2: 99%   Weight: 46.3 kg (102 lb)   Height: 165.1 cm (65\")     Body mass index is 16.97 kg/m².      Physical Exam  Vitals reviewed.   Constitutional:       " General: She is not in acute distress.     Appearance: She is well-developed. She is not ill-appearing or toxic-appearing.   HENT:      Head: Normocephalic.   Pulmonary:      Effort: Pulmonary effort is normal. No respiratory distress.      Breath sounds: No wheezing, rhonchi or rales.   Neurological:      Mental Status: She is alert and oriented to person, place, and time.      Cranial Nerves: No dysarthria or facial asymmetry.      Gait: Gait normal.   Psychiatric:         Behavior: Behavior normal.         Thought Content: Thought content normal.         Assessment/ Plan  Diagnoses and all orders for this visit:    Bronchiectasis without complication    Abnormal CT of the chest    History of hemoptysis    Other orders  -     latanoprost (XALATAN) 0.005 % ophthalmic solution        Return for Next scheduled follow up.      Discussion:  Lani Lopez is a 79 y.o. female RTC in short interval f/u after recent hemoptysis event, s/p pulmonary evaluation, CT chest, and bronchoscopy.  Patient s/p pulmonary follow-up after bronchoscopy and awaiting any growth on BAL culture.  Patient feeling well and reports no recurrence of hemoptysis over the last 5 weeks.  Worried well.  Reviewed with patient again today potential diagnosis of mycobacterial infection in lungs and need to continue to address, regardless of active symptomatology.  Reviewed [facility] laboratory data in chart.  Reassured OK to leave for vacation with  in [place]  Reviewed when to seek emergent care for recurrent hemoptysis events.  Will obtain pulmonary note for review today.    RTC as planned.    Total time of visit ~ 17 minutes

## 2024-10-21 LAB
FUNGUS WND CULT: ABNORMAL
FUNGUS WND CULT: ABNORMAL

## 2024-10-30 DIAGNOSIS — M81.0 AGE-RELATED OSTEOPOROSIS WITHOUT CURRENT PATHOLOGICAL FRACTURE: ICD-10-CM

## 2024-10-30 RX ORDER — ALENDRONATE SODIUM 70 MG/1
TABLET ORAL
Qty: 12 TABLET | Refills: 3 | Status: SHIPPED | OUTPATIENT
Start: 2024-10-30

## 2024-11-04 LAB
MYCOBACTERIUM SPEC CULT: NORMAL
MYCOBACTERIUM SPEC CULT: NORMAL
NIGHT BLUE STAIN TISS: NORMAL
NIGHT BLUE STAIN TISS: NORMAL

## 2024-11-18 ENCOUNTER — APPOINTMENT (OUTPATIENT)
Dept: WOMENS IMAGING | Facility: HOSPITAL | Age: 79
End: 2024-11-18
Payer: MEDICARE

## 2024-11-18 PROCEDURE — 77067 SCR MAMMO BI INCL CAD: CPT | Performed by: RADIOLOGY

## 2024-11-18 PROCEDURE — 77063 BREAST TOMOSYNTHESIS BI: CPT | Performed by: RADIOLOGY

## 2024-12-09 ENCOUNTER — TRANSCRIBE ORDERS (OUTPATIENT)
Dept: ADMINISTRATIVE | Facility: HOSPITAL | Age: 79
End: 2024-12-09
Payer: MEDICARE

## 2024-12-09 DIAGNOSIS — R91.1 PULMONARY NODULE: Primary | ICD-10-CM

## 2024-12-23 DIAGNOSIS — Z13.29 SCREENING FOR THYROID DISORDER: ICD-10-CM

## 2024-12-23 DIAGNOSIS — R73.01 IFG (IMPAIRED FASTING GLUCOSE): ICD-10-CM

## 2024-12-23 DIAGNOSIS — Z00.00 ENCOUNTER FOR ANNUAL WELLNESS VISIT (AWV) IN MEDICARE PATIENT: Primary | ICD-10-CM

## 2024-12-24 LAB
ALBUMIN SERPL-MCNC: 4.4 G/DL (ref 3.5–5.2)
ALBUMIN/GLOB SERPL: 1.5 G/DL
ALP SERPL-CCNC: 67 U/L (ref 39–117)
ALT SERPL-CCNC: 22 U/L (ref 1–33)
APPEARANCE UR: CLEAR
AST SERPL-CCNC: 30 U/L (ref 1–32)
BACTERIA #/AREA URNS HPF: NORMAL /[HPF]
BASOPHILS # BLD AUTO: 0.04 10*3/MM3 (ref 0–0.2)
BASOPHILS NFR BLD AUTO: 0.6 % (ref 0–1.5)
BILIRUB SERPL-MCNC: 0.4 MG/DL (ref 0–1.2)
BILIRUB UR QL STRIP: NEGATIVE
BUN SERPL-MCNC: 11 MG/DL (ref 8–23)
BUN/CREAT SERPL: 12.2 (ref 7–25)
CALCIUM SERPL-MCNC: 9.4 MG/DL (ref 8.6–10.5)
CASTS URNS QL MICRO: NORMAL /LPF
CHLORIDE SERPL-SCNC: 105 MMOL/L (ref 98–107)
CHOLEST SERPL-MCNC: 233 MG/DL (ref 0–200)
CHOLEST/HDLC SERPL: 2.65 {RATIO}
CO2 SERPL-SCNC: 27.7 MMOL/L (ref 22–29)
COLOR UR: YELLOW
CREAT SERPL-MCNC: 0.9 MG/DL (ref 0.57–1)
EGFRCR SERPLBLD CKD-EPI 2021: 66 ML/MIN/1.73
EOSINOPHIL # BLD AUTO: 0.2 10*3/MM3 (ref 0–0.4)
EOSINOPHIL NFR BLD AUTO: 3.2 % (ref 0.3–6.2)
EPI CELLS #/AREA URNS HPF: NORMAL /HPF (ref 0–10)
ERYTHROCYTE [DISTWIDTH] IN BLOOD BY AUTOMATED COUNT: 11.6 % (ref 12.3–15.4)
GLOBULIN SER CALC-MCNC: 2.9 GM/DL
GLUCOSE SERPL-MCNC: 97 MG/DL (ref 65–99)
GLUCOSE UR QL STRIP: NEGATIVE
HBA1C MFR BLD: 5.4 % (ref 4.8–5.6)
HCT VFR BLD AUTO: 40.9 % (ref 34–46.6)
HDLC SERPL-MCNC: 88 MG/DL (ref 40–60)
HGB BLD-MCNC: 13.8 G/DL (ref 12–15.9)
HGB UR QL STRIP: NEGATIVE
IMM GRANULOCYTES # BLD AUTO: 0.02 10*3/MM3 (ref 0–0.05)
IMM GRANULOCYTES NFR BLD AUTO: 0.3 % (ref 0–0.5)
KETONES UR QL STRIP: NEGATIVE
LDLC SERPL CALC-MCNC: 134 MG/DL (ref 0–100)
LEUKOCYTE ESTERASE UR QL STRIP: NEGATIVE
LYMPHOCYTES # BLD AUTO: 1.56 10*3/MM3 (ref 0.7–3.1)
LYMPHOCYTES NFR BLD AUTO: 25 % (ref 19.6–45.3)
MCH RBC QN AUTO: 32.2 PG (ref 26.6–33)
MCHC RBC AUTO-ENTMCNC: 33.7 G/DL (ref 31.5–35.7)
MCV RBC AUTO: 95.3 FL (ref 79–97)
MICRO URNS: NORMAL
MICRO URNS: NORMAL
MONOCYTES # BLD AUTO: 0.56 10*3/MM3 (ref 0.1–0.9)
MONOCYTES NFR BLD AUTO: 9 % (ref 5–12)
NEUTROPHILS # BLD AUTO: 3.85 10*3/MM3 (ref 1.7–7)
NEUTROPHILS NFR BLD AUTO: 61.9 % (ref 42.7–76)
NITRITE UR QL STRIP: NEGATIVE
NRBC BLD AUTO-RTO: 0 /100 WBC (ref 0–0.2)
PH UR STRIP: 7.5 [PH] (ref 5–7.5)
PLATELET # BLD AUTO: 247 10*3/MM3 (ref 140–450)
POTASSIUM SERPL-SCNC: 4.3 MMOL/L (ref 3.5–5.2)
PROT SERPL-MCNC: 7.3 G/DL (ref 6–8.5)
PROT UR QL STRIP: NEGATIVE
RBC # BLD AUTO: 4.29 10*6/MM3 (ref 3.77–5.28)
RBC #/AREA URNS HPF: NORMAL /HPF (ref 0–2)
SODIUM SERPL-SCNC: 142 MMOL/L (ref 136–145)
SP GR UR STRIP: 1 (ref 1–1.03)
TRIGL SERPL-MCNC: 65 MG/DL (ref 0–150)
TSH SERPL DL<=0.005 MIU/L-ACNC: 2.52 UIU/ML (ref 0.27–4.2)
URINALYSIS REFLEX: NORMAL
UROBILINOGEN UR STRIP-MCNC: 0.2 MG/DL (ref 0.2–1)
VLDLC SERPL CALC-MCNC: 11 MG/DL (ref 5–40)
WBC # BLD AUTO: 6.23 10*3/MM3 (ref 3.4–10.8)
WBC #/AREA URNS HPF: NORMAL /HPF (ref 0–5)

## 2024-12-27 ENCOUNTER — OFFICE VISIT (OUTPATIENT)
Dept: INTERNAL MEDICINE | Facility: CLINIC | Age: 79
End: 2024-12-27
Payer: MEDICARE

## 2024-12-27 VITALS
BODY MASS INDEX: 18.59 KG/M2 | HEIGHT: 65 IN | TEMPERATURE: 97.8 F | HEART RATE: 64 BPM | WEIGHT: 111.6 LBS | OXYGEN SATURATION: 98 % | SYSTOLIC BLOOD PRESSURE: 140 MMHG | DIASTOLIC BLOOD PRESSURE: 80 MMHG

## 2024-12-27 DIAGNOSIS — J47.9 BRONCHIECTASIS WITHOUT COMPLICATION: ICD-10-CM

## 2024-12-27 DIAGNOSIS — Z00.01 ENCOUNTER FOR GENERAL ADULT MEDICAL EXAMINATION WITH ABNORMAL FINDINGS: Primary | ICD-10-CM

## 2024-12-27 DIAGNOSIS — H40.1134 PRIMARY OPEN ANGLE GLAUCOMA (POAG) OF BOTH EYES, INDETERMINATE STAGE: ICD-10-CM

## 2024-12-27 DIAGNOSIS — H90.3 SENSORINEURAL HEARING LOSS (SNHL) OF BOTH EARS: ICD-10-CM

## 2024-12-27 DIAGNOSIS — Z00.00 ENCOUNTER FOR SUBSEQUENT ANNUAL WELLNESS VISIT (AWV) IN MEDICARE PATIENT: ICD-10-CM

## 2024-12-27 DIAGNOSIS — M81.0 OSTEOPOROSIS OF LUMBAR SPINE: ICD-10-CM

## 2024-12-27 DIAGNOSIS — I83.92 ASYMPTOMATIC VARICOSE VEINS OF LEFT LOWER EXTREMITY: ICD-10-CM

## 2024-12-27 DIAGNOSIS — R45.89 ANXIETY ABOUT HEALTH: ICD-10-CM

## 2024-12-27 DIAGNOSIS — D12.6 ADENOMATOUS POLYP OF COLON, UNSPECIFIED PART OF COLON: ICD-10-CM

## 2024-12-27 DIAGNOSIS — Z12.31 SCREENING MAMMOGRAM FOR BREAST CANCER: ICD-10-CM

## 2024-12-27 DIAGNOSIS — M85.859 OSTEOPENIA OF HIP, UNSPECIFIED LATERALITY: ICD-10-CM

## 2024-12-27 DIAGNOSIS — E04.2 MULTIPLE THYROID NODULES: ICD-10-CM

## 2024-12-27 PROCEDURE — 1126F AMNT PAIN NOTED NONE PRSNT: CPT | Performed by: INTERNAL MEDICINE

## 2024-12-27 PROCEDURE — 1170F FXNL STATUS ASSESSED: CPT | Performed by: INTERNAL MEDICINE

## 2024-12-27 PROCEDURE — 1159F MED LIST DOCD IN RCRD: CPT | Performed by: INTERNAL MEDICINE

## 2024-12-27 PROCEDURE — G0439 PPPS, SUBSEQ VISIT: HCPCS | Performed by: INTERNAL MEDICINE

## 2024-12-27 PROCEDURE — 1160F RVW MEDS BY RX/DR IN RCRD: CPT | Performed by: INTERNAL MEDICINE

## 2024-12-27 PROCEDURE — 99397 PER PM REEVAL EST PAT 65+ YR: CPT | Performed by: INTERNAL MEDICINE

## 2024-12-27 NOTE — PROGRESS NOTES
Subjective   Lani Lopez is a 79 y.o. female who presents for a Medicare Well Visit    Patient Care Team:  Virgilio Rivas MD as PCP - General (Internal Medicine)    Recent Hospitalizations: No recent hospitalization(s).    Depression Screen:       2024     1:02 PM   PHQ-2/PHQ-9 Depression Screening   Little interest or pleasure in doing things Not at all   Feeling down, depressed, or hopeless Not at all       Functional and Cognitive Screenin/27/2024     1:10 PM   Functional & Cognitive Status   Do you have difficulty preparing food and eating? No   Do you have difficulty bathing yourself, getting dressed or grooming yourself? No   Do you have difficulty using the toilet? No   Do you have difficulty moving around from place to place? No   Do you have trouble with steps or getting out of a bed or a chair? No   Current Diet Well Balanced Diet   Dental Exam Up to date   Eye Exam Up to date   Exercise (times per week) 3 times per week   Current Exercises Include Walking;Tennis;Light Weights   Do you need help using the phone?  No   Are you deaf or do you have serious difficulty hearing?  No   Do you need help to go to places out of walking distance? No   Do you need help shopping? No   Do you need help preparing meals?  No   Do you need help with housework?  No   Do you need help with laundry? No   Do you need help taking your medications? No   Do you need help managing money? No   Do you ever drive or ride in a car without wearing a seat belt? No   Have you felt unusual stress, anger or loneliness in the last month? No   Who do you live with? Spouse   If you need help, do you have trouble finding someone available to you? No   Have you been bothered in the last four weeks by sexual problems? No   Do you have difficulty concentrating, remembering or making decisions? No       Does the patient have evidence of cognitive impairment? no    No opioid medication identified on active medication list. I  "have reviewed chart for other potential  high risk medication/s and harmful drug interactions in the elderly.          Does not need ASA (and currently is not on it)    Vitals:    12/27/24 1311   BP: 140/80   BP Location: Left arm   Patient Position: Sitting   Cuff Size: Adult   Pulse: 64   Temp: 97.8 °F (36.6 °C)   TempSrc: Infrared   SpO2: 98%   Weight: 50.6 kg (111 lb 9.6 oz)   Height: 165.1 cm (65\")   PainSc: 0-No pain       Body mass index is 18.57 kg/m².    Visual Acuity:  No results found.    Advanced Care Planning:  ACP discussion was held with the patient during this visit. Patient has an advance directive (not in EMR), copy requested.    Compared to one year ago, the patient feels physical health is the same.  Compared to one year ago, the patient feels mental health is the same.    Reviewed chart for potential of high risk medication in the elderly: yes  Reviewed chart for potential of harmful drug interactions in the elderly:yes    Identification of Risk Factors:  Risk factors include: Advance Directive Discussion  Breast Cancer/Mammogram Screening  Colon Cancer Screening  Diabetic Lab Screening   Glaucoma Risk  Hearing Problem  Immunizations Discussed/Encouraged (specific immunizations; Tdap, Hepatitis A Vaccine/Series, Influenza, Pneumococcal 23, Shingrix, COVID19, and RSV (Respiratory Syncytial Virus) ).    Patient Self-Management and Personalized Health Advice  The patient has been provided with information about: diet, exercise, and mental health concerns and preventive services including:   Annual Wellness Visit (AWV)  Bone Density Measurements  Colorectal Cancer Screening, Colonoscopy  Screening Mammography .  Follow Up: Medicare visit in one year    Discussed the patient's BMI with her. The BMI is in the acceptable range.    Patient has multiple medical problems which are significant and separately identifiable that require additional work above and beyond the Medicare Wellness Visit. These are " "not trivial or insignificant and are billed with a 25-modifier    Chief Complaint   Patient presents with    Medicare Wellness-subsequent     Review of medical issues.       HPI:  Lani Lopez is a 79 y.o. female RTC in yearly CPE/ AWV, review of medical issues:  \"To tell you the truth. I feel like I am 27\".   1. Hemoptysis event, s/p pulmonary evaluation, CT chest, and bronchoscopy - growth has been negative on cx.  Saw pulmonary 12/9/24. Has appt for f/u and CT 1/2024. Really has no sx at this time. No cough. No hemoptysis.  Saw Dr. Fraga.   2. Glaucoma - new dx in 2024, after years as glaucoma suspect. Is on latanoprost from [facility].  Pressures down to 14 on last check. Seeing Dr. Bazzi in optho, last saw in 8/2024. Will see in 3/2024.   3. Osteoporosis - recall ~2-3 years of Fosamax in past.  Tolerating Fosamax restart since~4/2023.  Is still active with daily exercise and taking calcium and vitamin D daily.  Asked \"can I get off of it?\".  Nurse friend at [facility] told her it was \"a bad drug and it caused her to break her femur\" (of note this friend had been on bisphosphonates for 20 years).  WOrried well about drug safety.   4. Thyroid nodule, 1.1cm, L lobe incidental note on CT chest, U/S 10/2021 with bx - Path benign 10/2021. S/P ENT 4/2022 and 4/2023 with US, stable.  Saw ENT 4/2024, appt upcopming. NO changes. No issues swallowing.    5. Varicose Veins B, focal large varicosity L leg - asx'ic. No pain.   6. Vertigo, incidental note on past exam, sx onset sitting up from lying, offset ~3 seconds at prior visit - Had some mild recurrence in 1/12024, resolved now. Wants a handout on [employer] to start exercises if has any recurrence  7. HM -flu/COVID vaccine completed fall 2024; not had RSV vaccine yet      Review of Systems   Constitutional: Positive for weight gain (a few pounds). Negative for chills, fever and malaise/fatigue.   HENT:  Positive for hearing loss (has aides, new in 2024, wears most days.). " Negative for congestion, odynophagia and sore throat.    Eyes:  Negative for discharge, double vision, pain, redness, vision loss in left eye and vision loss in right eye.   Cardiovascular:  Negative for chest pain, dyspnea on exertion, irregular heartbeat, leg swelling, near-syncope, palpitations and syncope.   Respiratory:  Negative for cough, hemoptysis (No recurrence), shortness of breath and sputum production.    Endocrine: Negative for polydipsia, polyphagia and polyuria.   Hematologic/Lymphatic: Negative for bleeding problem. Does not bruise/bleed easily.   Skin:  Negative for rash and suspicious lesions.   Musculoskeletal:  Negative for joint pain, joint swelling, muscle cramps, muscle weakness and myalgias.   Gastrointestinal:  Negative for constipation, diarrhea, dysphagia, heartburn, nausea and vomiting.   Genitourinary:  Negative for dysuria, frequency, hematuria, hesitancy and incomplete emptying.   Neurological:  Negative for dizziness, headaches and light-headedness.   Psychiatric/Behavioral:  Negative for depression. The patient does not have insomnia and is not nervous/anxious.    Allergic/Immunologic: Negative for environmental allergies and persistent infections.       @OBJECTIVE BEGIN@  Vitals:    12/27/24 1311   BP: 140/80   Pulse: 64   Temp: 97.8 °F (36.6 °C)   SpO2: 98%     Physical Exam  Vitals reviewed. Exam conducted with a chaperone present (CHYNA Giron).   Constitutional:       General: She is not in acute distress.     Appearance: Normal appearance. She is well-developed. She is not ill-appearing or toxic-appearing.   HENT:      Head: Normocephalic and atraumatic.      Right Ear: Tympanic membrane, ear canal and external ear normal. There is no impacted cerumen.      Left Ear: Tympanic membrane, ear canal and external ear normal. There is no impacted cerumen.      Nose: Nose normal.      Mouth/Throat:      Mouth: Mucous membranes are moist.      Pharynx: Oropharynx is clear. Uvula midline.  No oropharyngeal exudate.   Eyes:      General: Lids are normal. No scleral icterus.     Extraocular Movements: Extraocular movements intact.      Conjunctiva/sclera: Conjunctivae normal.      Pupils: Pupils are equal, round, and reactive to light.   Neck:      Thyroid: Thyroid mass present. No thyromegaly or thyroid tenderness.      Vascular: No carotid bruit.      Trachea: Trachea normal.   Cardiovascular:      Rate and Rhythm: Normal rate and regular rhythm.      Pulses:           Carotid pulses are 2+ on the right side and 2+ on the left side.       Radial pulses are 2+ on the right side and 2+ on the left side.        Dorsalis pedis pulses are 2+ on the right side and 2+ on the left side.        Posterior tibial pulses are 2+ on the right side and 2+ on the left side.      Heart sounds: Normal heart sounds, S1 normal and S2 normal. No murmur heard.     No friction rub. No gallop.      Comments: Diffuse large varicose veins in BLE  Pulmonary:      Effort: Pulmonary effort is normal. No tachypnea, accessory muscle usage or respiratory distress.      Breath sounds: Examination of the right-lower field reveals wheezing. Wheezing (Faint, fleeting) present. No decreased breath sounds, rhonchi or rales.   Chest:      Chest wall: No mass.   Breasts:     Right: No inverted nipple, mass, nipple discharge or skin change.      Left: No inverted nipple, mass, nipple discharge or skin change.      Comments: Fibrocystic changes noted bilateral breasts  Abdominal:      General: Bowel sounds are normal. There is no distension.      Palpations: Abdomen is soft. There is no mass.      Tenderness: There is no abdominal tenderness. There is no guarding or rebound.   Musculoskeletal:         General: Normal range of motion.      Right shoulder: No tenderness, bony tenderness or crepitus. Normal range of motion.      Left shoulder: No tenderness, bony tenderness or crepitus. Normal range of motion.      Right hand: No tenderness or  bony tenderness. Normal range of motion. Normal strength.      Left hand: No tenderness or bony tenderness. Normal range of motion. Normal strength.      Cervical back: Full passive range of motion without pain, normal range of motion and neck supple. No muscular tenderness.      Right lower leg: No edema.      Left lower leg: No edema.      Right foot: Normal range of motion. Deformity (Mild hallux valgus great toe) present. No bunion.      Left foot: Normal range of motion. Deformity (Mild hallux valgus great toe) present. No bunion.   Feet:      Right foot:      Skin integrity: Callus (Medial great toe) present. No ulcer, blister or skin breakdown.      Left foot:      Skin integrity: No ulcer, blister or skin breakdown.   Lymphadenopathy:      Cervical: No cervical adenopathy.      Right cervical: No superficial, deep or posterior cervical adenopathy.     Left cervical: No superficial, deep or posterior cervical adenopathy.      Upper Body:      Right upper body: No supraclavicular, axillary or pectoral adenopathy.      Left upper body: No supraclavicular, axillary or pectoral adenopathy.      Lower Body: No right inguinal adenopathy. No left inguinal adenopathy.   Skin:     General: Skin is warm and dry.      Findings: No rash.   Neurological:      Mental Status: She is alert and oriented to person, place, and time.      Cranial Nerves: No cranial nerve deficit, dysarthria or facial asymmetry.      Sensory: No sensory deficit.      Motor: No weakness, tremor, atrophy or abnormal muscle tone.      Gait: Gait normal.      Deep Tendon Reflexes: Reflexes are normal and symmetric.      Reflex Scores:       Patellar reflexes are 2+ on the right side and 2+ on the left side.       Achilles reflexes are 2+ on the right side and 2+ on the left side.  Psychiatric:         Mood and Affect: Mood normal.         Behavior: Behavior normal.         Thought Content: Thought content normal.           Assessment & Plan    Diagnoses and all orders for this visit:    1. Encounter for general adult medical examination with abnormal findings (Primary)    2. Encounter for subsequent annual wellness visit (AWV) in Medicare patient    3. Bronchiectasis without complication    4. Multiple thyroid nodules    5. Osteoporosis of lumbar spine    6. Osteopenia of hip, unspecified laterality    7. Adenomatous polyp of colon, unspecified part of colon    8. Asymptomatic varicose veins of left lower extremity    9. Anxiety about health    10. Primary open angle glaucoma (POAG) of both eyes, indeterminate stage    11. Screening mammogram for breast cancer    12. Sensorineural hearing loss (SNHL) of both ears          Diagnoses and all orders for this visit:    Encounter for general adult medical examination with abnormal findings    Encounter for subsequent annual wellness visit (AWV) in Medicare patient    Bronchiectasis without complication    Multiple thyroid nodules    Osteoporosis of lumbar spine    Osteopenia of hip, unspecified laterality    Adenomatous polyp of colon, unspecified part of colon    Asymptomatic varicose veins of left lower extremity    Anxiety about health    Primary open angle glaucoma (POAG) of both eyes, indeterminate stage    Screening mammogram for breast cancer    Sensorineural hearing loss (SNHL) of both ears        Lani Lopez is a 79 y.o. female RTC in yearly CPE/ AWV, review of medical issues:    1.  Bronchiectasis, s/p hemoptysis event 9/2024, s/p pulmonary evaluation, CT chest, and bronchoscopy -suspected MAC, however diagnosis inconclusive at this time.  Patient asymptomatic.  F/U pulmonary 1/2025.  F/U Dr. Fraga in pulmonary.   2. Glaucoma; new dx in 2024, after years as glaucoma suspect -  pressures improved on latanoprost, per optho. UTD optho 8/2024.   3. Osteoporosis, 6/2022 DEXA showed progression of bone density loss, severe osteoporosis in lumbar spine with T score -3.9 -restart on Fosamax ~6/2022,  per chart review.  Good tolerance.  Counseled patient at length today on time-limited nature of Fosamax treatment to avoid abnormal bone remodeling and paradoxical fracture risk increase, after patient was told by friend to [employer] to stop her bisphosphonate.  After counseling, patient agrees to continue for ~2 additional years, and recheck DEXA 7/2026 anticipating drug holiday at that time. C/W Ca++D. Remain active daily with weight bearing exercise.   4. Colon polyps, 11/2022 with 2 polyps, TA's - f/u 5 years C-scope.   5. Thyroid nodule, 1.1cm, L lobe incidental note on CT chest, U/S 10/2021 with bx - Path benign 10/2021. S/P ENT 4/2022 and 4/2023 and 4/2024, stable.  Follows annually.   6.  Sensorineural hearing loss -s/p audiology evaluation now has hearing aids bilaterally.  Advise daily use.  7. Varicose Veins B, focal large varicosity L leg - asx'ic. Declined vein clinic eval in past.  8. Vertigo, BPPV -recent recurrence 11/2024.  [employer] handout provided again today for initiation for any recurrent SX.  9. HM - labs d/w pt; Flu/ Tdap/ Prevnar/ Pvax/ Shingrix/ COVID - UTD; RSV vacccine reviewed with pt, advised to complete; declines hep A; C-scope 11/2022 with 2 polyps, TA's --> 5 years; Mammo (-) 11/2024, breast exam OK today; Pap D/C s/p KALANI-BSO for benign cyst; DEXA due 7/2026; Hep C Ab (-) 9/2020; c/w TLC diet and exercise as she is; Preventative care plan provided to pt at end of visit    Return in about 1 year (around 12/27/2025) for Annual physical.          Current Outpatient Medications:     alendronate (FOSAMAX) 70 MG tablet, TAKE 1 TABLET EVERY 7 DAYS, Disp: 12 tablet, Rfl: 3    calcium carbonate (OS-MARC) 600 MG tablet, Take 1 tablet by mouth Daily., Disp: , Rfl:     latanoprost (XALATAN) 0.005 % ophthalmic solution, , Disp: , Rfl:     magnesium oxide (MAG-OX) 400 MG tablet, Take 1 tablet by mouth Daily., Disp: , Rfl:     Multiple Vitamins-Minerals (ZINC PO), Take 1 tablet by mouth Daily., Disp: ,  Rfl:     VITAMIN D PO, Take 1 tablet by mouth Daily., Disp: , Rfl:

## 2024-12-27 NOTE — PATIENT INSTRUCTIONS
Medicare Wellness  Personal Prevention Plan of Service     Date of Office Visit:    Encounter Provider:  Virgilio Rivas MD  Place of Service:  [facility]  Patient Name: Lani Lopez  :  1945    As part of the Medicare Wellness portion of your visit today, we are providing you with this personalized preventive plan of services (PPPS). This plan is based upon recommendations of the [place] Preventive Services Task Force (USPSTF) and the Advisory Committee on Immunization Practices (ACIP).    This lists the preventive care services that should be considered, and provides dates of when you are due. Items listed as completed are up-to-date and do not require any further intervention.    Health Maintenance   Topic Date Due    RSV Vaccine - Adults (1 - 1-dose 75+ series) Never done    ANNUAL WELLNESS VISIT  2025    DXA SCAN  2026    TDAP/TD VACCINES (2 - Td or Tdap) 2027    COLORECTAL CANCER SCREENING  2032    HEPATITIS C SCREENING  Completed    COVID-19 Vaccine  Completed    INFLUENZA VACCINE  Completed    Pneumococcal Vaccine 65+  Completed    ZOSTER VACCINE  Completed    MAMMOGRAM  Discontinued       No orders of the defined types were placed in this encounter.      Return in about 1 year (around 2025) for Annual physical.

## 2025-01-24 ENCOUNTER — TRANSCRIBE ORDERS (OUTPATIENT)
Dept: ADMINISTRATIVE | Facility: HOSPITAL | Age: 80
End: 2025-01-24
Payer: MEDICARE

## 2025-01-24 DIAGNOSIS — R91.1 PULMONARY NODULE: Primary | ICD-10-CM

## 2025-02-13 ENCOUNTER — TRANSCRIBE ORDERS (OUTPATIENT)
Dept: ADMINISTRATIVE | Facility: HOSPITAL | Age: 80
End: 2025-02-13
Payer: MEDICARE

## 2025-02-13 DIAGNOSIS — E04.1 NONTOXIC UNINODULAR GOITER: Primary | ICD-10-CM

## 2025-02-26 ENCOUNTER — HOSPITAL ENCOUNTER (OUTPATIENT)
Facility: HOSPITAL | Age: 80
Discharge: HOME | End: 2025-02-26
Admitting: STUDENT IN AN ORGANIZED HEALTH CARE EDUCATION/TRAINING PROGRAM
Payer: MEDICARE

## 2025-02-26 DIAGNOSIS — R91.1 PULMONARY NODULE: ICD-10-CM

## 2025-02-26 PROCEDURE — 71250 CT THORAX DX C-: CPT

## 2025-03-05 ENCOUNTER — TRANSCRIBE ORDERS (OUTPATIENT)
Dept: ADMINISTRATIVE | Facility: HOSPITAL | Age: 80
End: 2025-03-05
Payer: MEDICARE

## 2025-03-05 DIAGNOSIS — J47.9 ADULT BRONCHIECTASIS: Primary | ICD-10-CM

## 2025-03-19 ENCOUNTER — HOSPITAL ENCOUNTER (OUTPATIENT)
Facility: HOSPITAL | Age: 80
Discharge: HOME | End: 2025-03-19
Admitting: OTOLARYNGOLOGY
Payer: MEDICARE

## 2025-03-19 DIAGNOSIS — E04.1 NONTOXIC UNINODULAR GOITER: ICD-10-CM

## 2025-03-19 PROCEDURE — 76536 US EXAM OF HEAD AND NECK: CPT

## 2025-03-21 ENCOUNTER — OFFICE VISIT (OUTPATIENT)
Dept: INTERNAL MEDICINE | Facility: CLINIC | Age: 80
End: 2025-03-21
Payer: MEDICARE

## 2025-03-21 VITALS
HEIGHT: 65 IN | BODY MASS INDEX: 18.44 KG/M2 | DIASTOLIC BLOOD PRESSURE: 70 MMHG | WEIGHT: 110.7 LBS | TEMPERATURE: 97.8 F | OXYGEN SATURATION: 99 % | RESPIRATION RATE: 14 BRPM | HEART RATE: 67 BPM | SYSTOLIC BLOOD PRESSURE: 120 MMHG

## 2025-03-21 DIAGNOSIS — R22.41 SUBCUTANEOUS NODULE OF RIGHT LOWER LEG: ICD-10-CM

## 2025-03-21 DIAGNOSIS — I83.93 ASYMPTOMATIC VARICOSE VEINS OF BOTH LOWER EXTREMITIES: Primary | ICD-10-CM

## 2025-03-21 PROCEDURE — 1159F MED LIST DOCD IN RCRD: CPT | Performed by: INTERNAL MEDICINE

## 2025-03-21 PROCEDURE — 1126F AMNT PAIN NOTED NONE PRSNT: CPT | Performed by: INTERNAL MEDICINE

## 2025-03-21 PROCEDURE — 1160F RVW MEDS BY RX/DR IN RCRD: CPT | Performed by: INTERNAL MEDICINE

## 2025-03-21 PROCEDURE — 99214 OFFICE O/P EST MOD 30 MIN: CPT | Performed by: INTERNAL MEDICINE

## 2025-03-21 NOTE — PROGRESS NOTES
"Cyst (Pt, C/o Knot on right leg since march )      HPI   Lani Lopez is a 80 y.o. female RTC in acute care:  Notes \"on March 1 noticed this nodule on my right leg.\"  Has small, firm, nontender nodule on right leg.  Notes \"that vein really popped out after I noticed the nodule\".  Has baseline varicose veins in BLE.  No pain noted.  Worried well, \"I just do not want to be a clot\".  No history of blood clot in past.    Did get US from thyroid result back on MyChart last night.  [place] reassured.  Sees ENT next week.    Review of Systems   Constitutional: Negative for chills and fever.   Cardiovascular:  Negative for leg swelling, near-syncope and syncope.   Hematologic/Lymphatic: Negative for bleeding problem.   Skin:  Negative for color change, rash and suspicious lesions.   Neurological:  Negative for dizziness and light-headedness.       The following portions of the patient's history were reviewed and updated as appropriate: allergies, current medications, past medical history, past social history, and problem list.      Current Outpatient Medications:     alendronate (FOSAMAX) 70 MG tablet, TAKE 1 TABLET EVERY 7 DAYS, Disp: 12 tablet, Rfl: 3    calcium carbonate (OS-MARC) 600 MG tablet, Take 1 tablet by mouth Daily., Disp: , Rfl:     latanoprost (XALATAN) 0.005 % ophthalmic solution, , Disp: , Rfl:     magnesium oxide (MAG-OX) 400 MG tablet, Take 1 tablet by mouth Daily., Disp: , Rfl:     Multiple Vitamins-Minerals (ZINC PO), Take 1 tablet by mouth Daily., Disp: , Rfl:     VITAMIN D PO, Take 1 tablet by mouth Daily., Disp: , Rfl:     Vitals:    03/21/25 0846   BP: 120/70   BP Location: Left arm   Patient Position: Sitting   Cuff Size: Adult   Pulse: 67   Resp: 14   Temp: 97.8 °F (36.6 °C)   TempSrc: Oral   SpO2: 99%   Weight: 50.2 kg (110 lb 11.2 oz)   Height: 165.1 cm (65\")     Body mass index is 18.42 kg/m².      Physical Exam  Vitals reviewed.   Constitutional:       General: She is not in acute distress.   "   Appearance: She is well-developed. She is not ill-appearing or toxic-appearing.   HENT:      Head: Normocephalic.   Cardiovascular:      Comments: Diffuse scattered and varicose veins in BLE  Pulmonary:      Effort: Pulmonary effort is normal. No respiratory distress.   Musculoskeletal:      Right lower leg: No swelling, tenderness or bony tenderness. No edema.      Left lower leg: No swelling. No edema.   Skin:         Neurological:      Mental Status: She is alert and oriented to person, place, and time.   Psychiatric:         Behavior: Behavior normal.         Thought Content: Thought content normal.         Assessment/ Plan  Diagnoses and all orders for this visit:    Asymptomatic varicose veins of both lower extremities  -     Duplex Venous Lower Extremity - Right CAR; Future    Subcutaneous nodule of right lower leg  -     Duplex Venous Lower Extremity - Right CAR; Future        No follow-ups on file.      Discussion:  Lani Lopez is a 80 y.o. female with diffuse scattered varicose and spider veins in BLE RTC in acute care (new issue to examiner) with~20 days of acute onset, firm, nontender nodule right lateral lower leg with small dilated superficial vein just superior to it.  Exam and Hx otherwise nonfocal.  DDx includes superficial thrombophlebitis VS.  Ganglion cyst  -Check duplex venous RLE to eval for any blood clotting. F/U via phone thereafter  -No restrictions at this time    --> Multinodular thyroid -reviewed US in brief with patient today, noting stable nodules with need for 1 year US on TI-RADS nodules on left lobe.  Sees ENT next week.    RTC as planned.

## 2025-03-28 ENCOUNTER — HOSPITAL ENCOUNTER (OUTPATIENT)
Dept: CARDIOLOGY | Facility: HOSPITAL | Age: 80
Discharge: HOME | End: 2025-03-28
Payer: MEDICARE

## 2025-03-28 DIAGNOSIS — I83.93 ASYMPTOMATIC VARICOSE VEINS OF BOTH LOWER EXTREMITIES: ICD-10-CM

## 2025-03-28 DIAGNOSIS — R22.41 SUBCUTANEOUS NODULE OF RIGHT LOWER LEG: ICD-10-CM

## 2025-03-28 LAB
BH CV LOW VAS RIGHT VARICOSITY BK VESSEL: 1
BH CV LOWER VASCULAR RIGHT COMMON FEMORAL AUGMENT: NORMAL
BH CV LOWER VASCULAR RIGHT COMMON FEMORAL COMPETENT: NORMAL
BH CV LOWER VASCULAR RIGHT COMMON FEMORAL COMPRESS: NORMAL
BH CV LOWER VASCULAR RIGHT COMMON FEMORAL PHASIC: NORMAL
BH CV LOWER VASCULAR RIGHT COMMON FEMORAL SPONT: NORMAL
BH CV LOWER VASCULAR RIGHT DISTAL FEMORAL COMPRESS: NORMAL
BH CV LOWER VASCULAR RIGHT GASTRONEMIUS COMPRESS: NORMAL
BH CV LOWER VASCULAR RIGHT GREATER SAPH AK COMPRESS: NORMAL
BH CV LOWER VASCULAR RIGHT GREATER SAPH BK COMPRESS: NORMAL
BH CV LOWER VASCULAR RIGHT LESSER SAPH COMPRESS: NORMAL
BH CV LOWER VASCULAR RIGHT MID FEMORAL AUGMENT: NORMAL
BH CV LOWER VASCULAR RIGHT MID FEMORAL COMPETENT: NORMAL
BH CV LOWER VASCULAR RIGHT MID FEMORAL COMPRESS: NORMAL
BH CV LOWER VASCULAR RIGHT MID FEMORAL PHASIC: NORMAL
BH CV LOWER VASCULAR RIGHT MID FEMORAL SPONT: NORMAL
BH CV LOWER VASCULAR RIGHT PERONEAL COMPRESS: NORMAL
BH CV LOWER VASCULAR RIGHT POPLITEAL AUGMENT: NORMAL
BH CV LOWER VASCULAR RIGHT POPLITEAL COMPETENT: NORMAL
BH CV LOWER VASCULAR RIGHT POPLITEAL COMPRESS: NORMAL
BH CV LOWER VASCULAR RIGHT POPLITEAL PHASIC: NORMAL
BH CV LOWER VASCULAR RIGHT POPLITEAL SPONT: NORMAL
BH CV LOWER VASCULAR RIGHT POSTERIOR TIBIAL COMPRESS: NORMAL
BH CV LOWER VASCULAR RIGHT PROFUNDA FEMORAL COMPRESS: NORMAL
BH CV LOWER VASCULAR RIGHT PROXIMAL FEMORAL COMPRESS: NORMAL
BH CV LOWER VASCULAR RIGHT SAPHENOFEMORAL JUNCTION COMPRESS: NORMAL
BH CV LOWER VASCULAR RIGHT VARICOSITY BK COMPRESS: NORMAL
BH CV LOWER VASCULAR RIGHT VARICOSITY BK THROMBUS: NORMAL

## 2025-03-28 PROCEDURE — 93971 EXTREMITY STUDY: CPT

## 2025-06-30 ENCOUNTER — HOSPITAL ENCOUNTER (OUTPATIENT)
Facility: HOSPITAL | Age: 80
Discharge: HOME | End: 2025-06-30
Admitting: STUDENT IN AN ORGANIZED HEALTH CARE EDUCATION/TRAINING PROGRAM
Payer: MEDICARE

## 2025-06-30 DIAGNOSIS — J47.9 ADULT BRONCHIECTASIS: ICD-10-CM

## 2025-06-30 PROCEDURE — 71250 CT THORAX DX C-: CPT

## (undated) DEVICE — TRAP,MUCUS SPECIMEN, 80CC: Brand: MEDLINE

## (undated) DEVICE — CONTAINER,SPECIMEN,OR STERILE,4OZ: Brand: MEDLINE

## (undated) DEVICE — KT ORCA ORCAPOD DISP STRL

## (undated) DEVICE — SINGLE USE SUCTION VALVE MAJ-209: Brand: SINGLE USE SUCTION VALVE (STERILE)

## (undated) DEVICE — MEDI-VAC YANK SUCT HNDL W/TPRD BULBOUS TIP: Brand: CARDINAL HEALTH

## (undated) DEVICE — MSK AIRWY LARYNG LMA PILOT SZ4

## (undated) DEVICE — SINGLE USE BIOPSY VALVE MAJ-210: Brand: SINGLE USE BIOPSY VALVE (STERILE)

## (undated) DEVICE — SINGLE-USE BIOPSY FORCEPS: Brand: RADIAL JAW 4

## (undated) DEVICE — VAGINAL PACKING: Brand: DEROYAL

## (undated) DEVICE — DRSNG TELFA PAD NONADH STR 1S 3X4IN

## (undated) DEVICE — ADAPT CLN BIOGUARD AIR/H2O DISP

## (undated) DEVICE — TUBING, SUCTION, 1/4" X 10', STRAIGHT: Brand: MEDLINE

## (undated) DEVICE — ADAPT SWVL FIBROPTIC BRONCH

## (undated) DEVICE — ELECTRD BLD EZ CLN MOD XLNG 2.75IN

## (undated) DEVICE — LN SMPL CO2 SHTRM SD STREAM W/M LUER

## (undated) DEVICE — SENSR O2 OXIMAX FNGR A/ 18IN NONSTR

## (undated) DEVICE — CANN O2 ETCO2 FITS ALL CONN CO2 SMPL A/ 7IN DISP LF

## (undated) DEVICE — VITAL SIGNS™ JACKSON-REES CIRCUITS: Brand: VITAL SIGNS™